# Patient Record
Sex: FEMALE | Race: OTHER | HISPANIC OR LATINO | Employment: OTHER | URBAN - METROPOLITAN AREA
[De-identification: names, ages, dates, MRNs, and addresses within clinical notes are randomized per-mention and may not be internally consistent; named-entity substitution may affect disease eponyms.]

---

## 2017-08-05 ENCOUNTER — HOSPITAL ENCOUNTER (EMERGENCY)
Facility: HOSPITAL | Age: 47
Discharge: HOME | End: 2017-08-06
Attending: EMERGENCY MEDICINE
Payer: MEDICARE

## 2017-08-05 DIAGNOSIS — J45.901 ACUTE SEVERE EXACERBATION OF ASTHMA: Primary | ICD-10-CM

## 2017-08-05 RX ORDER — ALBUTEROL SULFATE 2.5 MG/3ML
5 SOLUTION RESPIRATORY (INHALATION) ONCE
Status: COMPLETED | OUTPATIENT
Start: 2017-08-06 | End: 2017-08-06

## 2017-08-06 ENCOUNTER — APPOINTMENT (EMERGENCY)
Dept: RADIOLOGY | Facility: HOSPITAL | Age: 47
End: 2017-08-06
Payer: MEDICARE

## 2017-08-06 VITALS
DIASTOLIC BLOOD PRESSURE: 48 MMHG | HEART RATE: 127 BPM | OXYGEN SATURATION: 98 % | SYSTOLIC BLOOD PRESSURE: 84 MMHG | TEMPERATURE: 98.6 F | RESPIRATION RATE: 22 BRPM

## 2017-08-06 PROCEDURE — 94640 AIRWAY INHALATION TREATMENT: CPT

## 2017-08-06 PROCEDURE — 99283 EMERGENCY DEPT VISIT LOW MDM: CPT

## 2017-08-06 PROCEDURE — 71020 HB CHEST X-RAY 2VW FRONTAL&LATL: CPT

## 2017-08-06 RX ORDER — ALBUTEROL SULFATE 90 UG/1
2 AEROSOL, METERED RESPIRATORY (INHALATION) EVERY 4 HOURS PRN
Qty: 1 INHALER | Refills: 0 | Status: SHIPPED | OUTPATIENT
Start: 2017-08-06

## 2017-08-06 RX ORDER — AZITHROMYCIN 250 MG/1
TABLET, FILM COATED ORAL
Qty: 6 TABLET | Refills: 0 | Status: SHIPPED | OUTPATIENT
Start: 2017-08-06

## 2017-08-06 RX ORDER — PREDNISONE 20 MG/1
40 TABLET ORAL DAILY
Qty: 10 TABLET | Refills: 0 | Status: SHIPPED | OUTPATIENT
Start: 2017-08-06

## 2017-08-06 RX ORDER — ALBUTEROL SULFATE 2.5 MG/3ML
5 SOLUTION RESPIRATORY (INHALATION) ONCE
Status: COMPLETED | OUTPATIENT
Start: 2017-08-06 | End: 2017-08-06

## 2017-08-06 RX ADMIN — ALBUTEROL SULFATE 5 MG: 2.5 SOLUTION RESPIRATORY (INHALATION) at 00:03

## 2017-08-06 RX ADMIN — PREDNISONE 50 MG: 10 TABLET ORAL at 01:19

## 2017-08-06 RX ADMIN — IPRATROPIUM BROMIDE 0.5 MG: 0.5 SOLUTION RESPIRATORY (INHALATION) at 01:18

## 2017-08-06 RX ADMIN — ALBUTEROL SULFATE 5 MG: 2.5 SOLUTION RESPIRATORY (INHALATION) at 01:18

## 2017-08-06 RX ADMIN — IPRATROPIUM BROMIDE 0.5 MG: 0.5 SOLUTION RESPIRATORY (INHALATION) at 00:03

## 2017-08-06 RX ADMIN — Medication 0.5 MG: at 00:03

## 2017-08-23 ENCOUNTER — DOCTOR'S OFFICE (OUTPATIENT)
Dept: URBAN - METROPOLITAN AREA CLINIC 136 | Facility: CLINIC | Age: 47
Setting detail: OPHTHALMOLOGY
End: 2017-08-23
Payer: COMMERCIAL

## 2017-08-23 DIAGNOSIS — H40.033: ICD-10-CM

## 2017-08-23 DIAGNOSIS — H25.13: ICD-10-CM

## 2017-08-23 DIAGNOSIS — E10.3553: ICD-10-CM

## 2017-08-23 PROCEDURE — 92004 COMPRE OPH EXAM NEW PT 1/>: CPT | Performed by: OPHTHALMOLOGY

## 2017-08-23 PROCEDURE — 92134 CPTRZ OPH DX IMG PST SGM RTA: CPT | Performed by: OPHTHALMOLOGY

## 2017-08-23 ASSESSMENT — REFRACTION_AUTOREFRACTION
OD_SPHERE: -1.00
OS_AXIS: 084
OS_SPHERE: -3.00
OD_AXIS: 105
OD_CYLINDER: -1.25
OS_CYLINDER: -1.25

## 2017-08-23 ASSESSMENT — REFRACTION_MANIFEST
OS_VA3: 20/
OS_VA2: 20/
OD_VA2: 20/
OD_VA1: 20/
OU_VA: 20/
OD_VA1: 20/
OU_VA: 20/
OS_VA1: 20/
OS_VA3: 20/
OD_VA3: 20/
OS_VA3: 20/
OD_VA1: 20/
OD_VA2: 20/
OS_VA1: 20/
OS_VA2: 20/
OS_VA1: 20/
OS_VA2: 20/
OD_VA2: 20/
OD_VA3: 20/
OU_VA: 20/
OD_VA3: 20/

## 2017-08-23 ASSESSMENT — KERATOMETRY
OS_K1POWER_DIOPTERS: 44.25
OD_K2POWER_DIOPTERS: 43.50
OS_K2POWER_DIOPTERS: 44.00
OD_AXISANGLE_DEGREES: 017
OS_AXISANGLE_DEGREES: 166
OD_K1POWER_DIOPTERS: 44.00

## 2017-08-23 ASSESSMENT — CONFRONTATIONAL VISUAL FIELD TEST (CVF)
OS_FINDINGS: FULL
OD_FINDINGS: FULL

## 2017-08-23 ASSESSMENT — SPHEQUIV_DERIVED
OD_SPHEQUIV: -1.625
OS_SPHEQUIV: -3.625

## 2017-08-23 ASSESSMENT — REFRACTION_CURRENTRX
OS_OVR_VA: 20/
OD_OVR_VA: 20/
OD_OVR_VA: 20/
OS_OVR_VA: 20/
OS_OVR_VA: 20/
OD_OVR_VA: 20/

## 2017-08-23 ASSESSMENT — VISUAL ACUITY
OD_BCVA: 20/40-1
OS_BCVA: 20/70-1

## 2017-08-23 ASSESSMENT — AXIALLENGTH_DERIVED
OS_AL: 24.8371
OD_AL: 24.1459

## 2017-10-18 ENCOUNTER — DOCTOR'S OFFICE (OUTPATIENT)
Dept: URBAN - METROPOLITAN AREA CLINIC 136 | Facility: CLINIC | Age: 47
Setting detail: OPHTHALMOLOGY
End: 2017-10-18
Payer: COMMERCIAL

## 2017-10-18 DIAGNOSIS — E10.3553: ICD-10-CM

## 2017-10-18 DIAGNOSIS — H25.13: ICD-10-CM

## 2017-10-18 DIAGNOSIS — H40.033: ICD-10-CM

## 2017-10-18 PROCEDURE — 92014 COMPRE OPH EXAM EST PT 1/>: CPT | Performed by: OPHTHALMOLOGY

## 2017-10-18 ASSESSMENT — REFRACTION_MANIFEST
OD_VA1: 20/
OD_VA2: 20/
OS_VA3: 20/
OS_VA3: 20/
OS_VA2: 20/
OD_VA3: 20/
OS_VA2: 20/
OS_VA1: 20/
OD_VA2: 20/
OD_VA2: 20/
OS_VA1: 20/
OD_VA1: 20/
OU_VA: 20/
OS_VA3: 20/
OD_VA3: 20/
OD_VA1: 20/
OS_VA2: 20/
OU_VA: 20/
OD_VA3: 20/
OS_VA1: 20/
OU_VA: 20/

## 2017-10-18 ASSESSMENT — REFRACTION_CURRENTRX
OS_OVR_VA: 20/
OD_OVR_VA: 20/
OD_OVR_VA: 20/
OS_OVR_VA: 20/
OD_OVR_VA: 20/
OS_OVR_VA: 20/

## 2017-10-18 ASSESSMENT — KERATOMETRY
OS_K2POWER_DIOPTERS: 43.75
OS_K1POWER_DIOPTERS: 44.25
OD_K1POWER_DIOPTERS: 43.50
OD_AXISANGLE_DEGREES: 017
OS_AXISANGLE_DEGREES: 012
OD_K2POWER_DIOPTERS: 43.00

## 2017-10-18 ASSESSMENT — AXIALLENGTH_DERIVED
OD_AL: 24.3398
OS_AL: 24.8881

## 2017-10-18 ASSESSMENT — SPHEQUIV_DERIVED
OS_SPHEQUIV: -3.625
OD_SPHEQUIV: -1.625

## 2017-10-18 ASSESSMENT — VISUAL ACUITY
OS_BCVA: 20/125
OD_BCVA: 20/50+2

## 2017-10-18 ASSESSMENT — REFRACTION_AUTOREFRACTION
OS_AXIS: 084
OD_AXIS: 105
OD_CYLINDER: -1.25
OD_SPHERE: -1.00
OS_CYLINDER: -1.25
OS_SPHERE: -3.00

## 2017-10-18 ASSESSMENT — CONFRONTATIONAL VISUAL FIELD TEST (CVF)
OD_FINDINGS: FULL
OS_FINDINGS: FULL

## 2017-11-15 ENCOUNTER — DOCTOR'S OFFICE (OUTPATIENT)
Dept: URBAN - METROPOLITAN AREA CLINIC 136 | Facility: CLINIC | Age: 47
Setting detail: OPHTHALMOLOGY
End: 2017-11-15
Payer: COMMERCIAL

## 2017-11-15 DIAGNOSIS — E10.3553: ICD-10-CM

## 2017-11-15 DIAGNOSIS — H25.13: ICD-10-CM

## 2017-11-15 DIAGNOSIS — H35.379: ICD-10-CM

## 2017-11-15 DIAGNOSIS — H40.033: ICD-10-CM

## 2017-11-15 PROCEDURE — 92014 COMPRE OPH EXAM EST PT 1/>: CPT | Performed by: OPHTHALMOLOGY

## 2017-11-15 PROCEDURE — 92134 CPTRZ OPH DX IMG PST SGM RTA: CPT | Performed by: OPHTHALMOLOGY

## 2017-11-15 ASSESSMENT — REFRACTION_MANIFEST
OD_VA1: 20/
OU_VA: 20/
OS_VA3: 20/
OU_VA: 20/
OS_VA1: 20/
OD_VA2: 20/
OS_VA2: 20/
OS_VA1: 20/
OS_VA2: 20/
OD_VA1: 20/
OD_VA3: 20/
OS_VA3: 20/
OD_VA3: 20/
OD_VA3: 20/
OS_VA3: 20/
OS_VA2: 20/
OU_VA: 20/
OD_VA2: 20/
OS_VA1: 20/
OD_VA2: 20/
OD_VA1: 20/

## 2017-11-15 ASSESSMENT — CONFRONTATIONAL VISUAL FIELD TEST (CVF)
OD_FINDINGS: FULL
OS_FINDINGS: FULL

## 2017-11-15 ASSESSMENT — REFRACTION_CURRENTRX
OS_OVR_VA: 20/
OS_OVR_VA: 20/
OD_OVR_VA: 20/
OS_OVR_VA: 20/

## 2017-11-15 ASSESSMENT — VISUAL ACUITY
OS_BCVA: 20/70
OD_BCVA: 20/50

## 2017-11-15 ASSESSMENT — SPHEQUIV_DERIVED
OD_SPHEQUIV: -1.625
OS_SPHEQUIV: -3.625

## 2017-11-15 ASSESSMENT — REFRACTION_AUTOREFRACTION
OD_CYLINDER: -1.25
OS_AXIS: 084
OD_AXIS: 105
OS_SPHERE: -3.00
OD_SPHERE: -1.00
OS_CYLINDER: -1.25

## 2018-05-16 ENCOUNTER — RX ONLY (RX ONLY)
Age: 48
End: 2018-05-16

## 2018-05-16 ENCOUNTER — DOCTOR'S OFFICE (OUTPATIENT)
Dept: URBAN - METROPOLITAN AREA CLINIC 136 | Facility: CLINIC | Age: 48
Setting detail: OPHTHALMOLOGY
End: 2018-05-16
Payer: COMMERCIAL

## 2018-05-16 DIAGNOSIS — H35.379: ICD-10-CM

## 2018-05-16 DIAGNOSIS — E10.3553: ICD-10-CM

## 2018-05-16 DIAGNOSIS — H25.13: ICD-10-CM

## 2018-05-16 DIAGNOSIS — H40.033: ICD-10-CM

## 2018-05-16 PROCEDURE — 92134 CPTRZ OPH DX IMG PST SGM RTA: CPT | Performed by: OPHTHALMOLOGY

## 2018-05-16 PROCEDURE — 92014 COMPRE OPH EXAM EST PT 1/>: CPT | Performed by: OPHTHALMOLOGY

## 2018-05-16 ASSESSMENT — REFRACTION_AUTOREFRACTION
OD_AXIS: 105
OD_CYLINDER: -1.25
OS_CYLINDER: -1.25
OD_SPHERE: -1.00
OS_AXIS: 084
OS_SPHERE: -3.00

## 2018-05-16 ASSESSMENT — REFRACTION_MANIFEST
OS_VA2: 20/
OD_VA1: 20/
OU_VA: 20/
OD_VA2: 20/
OD_VA1: 20/
OS_VA3: 20/
OS_VA1: 20/
OS_VA3: 20/
OS_VA1: 20/
OD_VA2: 20/
OU_VA: 20/
OD_VA3: 20/
OD_VA2: 20/
OD_VA3: 20/
OU_VA: 20/
OS_VA1: 20/
OD_VA3: 20/
OS_VA2: 20/
OS_VA2: 20/
OD_VA1: 20/
OS_VA3: 20/

## 2018-05-16 ASSESSMENT — REFRACTION_CURRENTRX
OD_OVR_VA: 20/
OD_OVR_VA: 20/
OS_OVR_VA: 20/
OS_OVR_VA: 20/
OD_OVR_VA: 20/
OS_OVR_VA: 20/

## 2018-05-16 ASSESSMENT — CONFRONTATIONAL VISUAL FIELD TEST (CVF)
OS_FINDINGS: FULL
OD_FINDINGS: FULL

## 2018-05-16 ASSESSMENT — SPHEQUIV_DERIVED
OS_SPHEQUIV: -3.625
OD_SPHEQUIV: -1.625

## 2018-05-16 ASSESSMENT — VISUAL ACUITY
OD_BCVA: 20/50-2
OS_BCVA: 20/80+1

## 2018-05-30 ENCOUNTER — DOCTOR'S OFFICE (OUTPATIENT)
Dept: URBAN - METROPOLITAN AREA CLINIC 136 | Facility: CLINIC | Age: 48
Setting detail: OPHTHALMOLOGY
End: 2018-05-30
Payer: COMMERCIAL

## 2018-05-30 DIAGNOSIS — H43.812: ICD-10-CM

## 2018-05-30 DIAGNOSIS — H40.033: ICD-10-CM

## 2018-05-30 DIAGNOSIS — E10.3553: ICD-10-CM

## 2018-05-30 DIAGNOSIS — H25.011: ICD-10-CM

## 2018-05-30 DIAGNOSIS — H25.012: ICD-10-CM

## 2018-05-30 PROCEDURE — 92014 COMPRE OPH EXAM EST PT 1/>: CPT | Performed by: OPHTHALMOLOGY

## 2018-05-30 ASSESSMENT — REFRACTION_MANIFEST
OD_VA3: 20/
OD_VA2: 20/
OS_VA1: 20/
OD_VA3: 20/
OS_VA3: 20/
OD_VA1: 20/
OU_VA: 20/
OS_VA2: 20/
OD_VA2: 20/
OS_VA2: 20/
OS_VA2: 20/
OD_VA3: 20/
OU_VA: 20/
OD_VA1: 20/
OS_VA1: 20/
OU_VA: 20/
OD_VA1: 20/
OD_VA2: 20/
OS_VA3: 20/
OS_VA1: 20/
OS_VA3: 20/

## 2018-05-30 ASSESSMENT — CONFRONTATIONAL VISUAL FIELD TEST (CVF)
OS_FINDINGS: FULL
OD_FINDINGS: FULL

## 2018-05-30 ASSESSMENT — REFRACTION_CURRENTRX
OS_OVR_VA: 20/
OD_OVR_VA: 20/
OS_OVR_VA: 20/
OD_OVR_VA: 20/
OS_OVR_VA: 20/
OD_OVR_VA: 20/

## 2018-05-30 ASSESSMENT — REFRACTION_AUTOREFRACTION
OS_AXIS: 084
OD_AXIS: 105
OD_SPHERE: -1.00
OS_CYLINDER: -1.25
OS_SPHERE: -3.00
OD_CYLINDER: -1.25

## 2018-05-30 ASSESSMENT — SPHEQUIV_DERIVED
OS_SPHEQUIV: -3.625
OD_SPHEQUIV: -1.625

## 2018-05-30 ASSESSMENT — VISUAL ACUITY
OD_BCVA: 20/50-2
OS_BCVA: 20/80-

## 2018-06-11 ENCOUNTER — HOSPITAL ENCOUNTER (EMERGENCY)
Facility: HOSPITAL | Age: 48
Discharge: HOME | End: 2018-06-11
Attending: EMERGENCY MEDICINE
Payer: MEDICARE

## 2018-06-11 VITALS
RESPIRATION RATE: 22 BRPM | SYSTOLIC BLOOD PRESSURE: 122 MMHG | TEMPERATURE: 97.9 F | HEART RATE: 107 BPM | DIASTOLIC BLOOD PRESSURE: 61 MMHG | OXYGEN SATURATION: 100 % | WEIGHT: 198 LBS

## 2018-06-11 DIAGNOSIS — J02.9 ACUTE VIRAL PHARYNGITIS: Primary | ICD-10-CM

## 2018-06-11 DIAGNOSIS — J06.9 VIRAL URI WITH COUGH: ICD-10-CM

## 2018-06-11 DIAGNOSIS — H66.93 BILATERAL OTITIS MEDIA: ICD-10-CM

## 2018-06-11 PROCEDURE — 99282 EMERGENCY DEPT VISIT SF MDM: CPT

## 2018-06-11 RX ORDER — IBUPROFEN 600 MG/1
600 TABLET ORAL EVERY 8 HOURS PRN
Qty: 30 TABLET | Refills: 0 | Status: SHIPPED | OUTPATIENT
Start: 2018-06-11

## 2018-06-11 RX ORDER — AZITHROMYCIN 250 MG/1
TABLET, FILM COATED ORAL
Qty: 6 TABLET | Refills: 0 | Status: SHIPPED | OUTPATIENT
Start: 2018-06-11 | End: 2018-06-16

## 2018-06-12 NOTE — DISCHARGE INSTRUCTIONS
Otitis Media   LO QUE NECESITA SABER:   La otitis media es momo infección en el oído  INSTRUCCIONES SOBRE [facility]:   Medicamentos:  · El ibuprofeno o el acetaminofeno  ayuda a disminuir el dolor y la [place]  Están disponibles sin receta médica  Consulte con matson médico cuál medicamento es el adecuado para usted  Pregunte la cantidad y la frecuencia con que debe tomarlos  Estos medicamentos pueden provocar sangrado estomacal si no se xu correctamente  El ibuprofeno puede provocar daño al Cindy Range  No tome ibuprofeno si usted tiene enfermedad de los riñones, Nikolai John o si es alérgico a la aspirina  El acetaminofeno puede dañar el hígado  No ingiera alcohol si alan acetaminofeno  · Gotas para los oídos  ayudan a tratar el dolor de oído  · Antibióticos  ayudan a tratar la infección bacterial que provocó la infección de oído  · [place] brandan medicamentos grecia se le haya indicado  Consulte con matson médico si usted nate que matson medicamento no le está ayudando o si presenta efectos secundarios  Infórmele si es alérgico a cualquier medicamento  Mantenga momo lista actualizada de los [employer], las vitaminas y los productos herbales que alan  Incluya los siguientes datos de los medicamentos: cantidad, frecuencia y motivo de administración  Traiga con usted la lista o los envases de la píldoras a brandan citas de seguimiento  Lleve la lista de los medicamentos con usted en cheo de momo emergencia  Calor o hielo:   · El calor  puede usarse para disminuir matson dolor  Coloque un pañuelo húmedo y tibio en el oído  Aplíquelo por 15 a 20 minutos, de 3 a 4 veces al día  · El hielo  ayuda a disminuir la inflamación y el dolor  Use moom bolsa con hielo o ponga hielo triturado en momo bolsa de plástico  Jearld Keen la bolsa con momo toalla y colóquela en el oído por 15 a 20 minutos, de 3 a 4 veces por 2 días  Prevenga la otitis media:   · Lávese las phil frecuentemente  Utilice agua y Toni   [address] usar el baño, cambiarle el pañal a un niki o estornudar  Lávese las phil antes de comer o preparar alimentos  · Aléjese de las personas enfermas  Algunos microbios se propagan fácil y rápidamente con el contacto  Regreso a la escuela o al Edilberto Ankit:  Grisel Precise podría regresar al Edilberto Ankit o a la escuela cuando desaparezca la fiebre  Acuda a brandan consultas de control con saxena médico según le indicaron  Anote brandan preguntas para que se acuerde de hacerlas david brandan visitas  Pregúntele a saxena Shabana Punch vitaminas y minerales son adecuados para usted  · El dolor del oído empeora o no desaparece, incluso después del [place]  · La pare exterior del oído está maryana o inflamada  · Tiene vómitos o diarrea  · Tiene líquido saliendo del oído  · Usted tiene preguntas o inquietudes acerca de saxena condición o cuidado  Busque atención médica de inmediato o llame al 911 si:   · Usted sufre momo convulsión  · Tiene fiebre y rigidez en el edi  © 2017 [address] Information is for End User's use only and may not be sold, redistributed or otherwise used for commercial purposes  All illustrations and images included in CareNotes® are the copyrighted property of A D A M , Inc  or Macario Fraga  Esta información es sólo para uso en educación  Saxena intención no es darle un consejo médico sobre enfermedades o tratamientos  Colsulte con saxena Benuel Cornell farmacéutico antes de seguir cualquier régimen médico para saber si es seguro y efectivo para usted  Infección respiratoria superior   LO QUE NECESITA SABER:   Momo infección respiratoria superior también se conoce grecia el resfriado común  Suni es momo infección que puede afectar saxena nariz, garganta, oídos y los senos frontales  Para personas saludables, el resfriado común generalmente no es grave y no requiere tratamiento especial  Los síntomas del resfriado generalmente son [place] graves david los primeros 3 a 5 días   [address] mejoran en 7 a 14 días  Puede que usted siga tosiendo por 2 a 3 semanas  Los resfriados son provocados por virus y no mejoran con antibióticos  INSTRUCCIONES SOBRE [facility]:   Busque atención médica de inmediato si:   · Usted tiene dolor torácico y dificultad para respirar  Pregúntele a matson Sherol Mince vitaminas y minerales son adecuados para usted  · Usted tiene fiebre de más de 102ºF [facility])  · Matson garganta irritada empeora o usted ve manchas brittny o alee en matson garganta  · Luisana síntomas empeoran después de 3 a 5 días o matson resfriado no mejora en 14 días  · Usted tiene sarpullido en alguna parte de matson piel  · Usted tiene bultos grandes y sensible en matson edi    · Usted tiene secreción espesa de color reji o amarillo proveniente de matson nariz  · Usted expectora mucosidad de color amarillo, reji o con [place]  · Usted vomita por más de 24 horas y no puede retener líquidos en el estómago  · Usted tiene un grave dolor de oído  · Usted tiene preguntas o inquietudes acerca de matson condición o cuidado  Medicamentos:  Es posible que usted necesite alguno de los siguientes:  · Los descongestionantes  ayudan a reducir la congestión nasal y Alveria Fogo a respirar más fácilmente  Si alan pastillas descongestionantes, pueden causarle agitación o problemas para dormir  No use aerosol descongestionante por más de unos cuantos días  · Los jarabes para la tos  ayudan a reducir la tos  Pregúntele a matson médico cuál tipo de medicamento para la tos es mejor para usted  · AINEs (Analgésicos antiinflamatorios no esteroides) grecia el ibuprofeno, ayudan a disminuir la inflamación, el dolor y la [place]  Los AINEs pueden causar sangrado estomacal o problemas renales en ciertas personas  Si usted alan un medicamento anticoagulante, siempre pregúntele a matson médico si los SAKINA son seguros para usted  Siempre tru la etiqueta de mirela medicamento y [place] instrucciones      · Acetaminofeno:  Joanna Calvin dolor y baja la fiebre  Está disponible sin receta médica  Pregunte la cantidad y la frecuencia con que debe tomarlos  [address]  [place] las etiquetas de todos los demás medicamentos que esté usando para saber si también contienen acetaminofén, o pregunte a matson médico o farmacéutico  El acetaminofén puede causar daño en el hígado cuando no se alan de forma correcta  No use más de 4 gramos (4000 miligramos) en total de acetaminofeno en un día  · [place] brandan medicamentos grecia se le haya indicado  Consulte con matson médico si usted nate que matson medicamento no le está ayudando o si presenta efectos secundarios  Infórmele si es alérgico a cualquier medicamento  Mantenga momo lista actualizada de los [place], las vitaminas y los productos herbales que alan  Incluya los siguientes datos de los medicamentos: cantidad, frecuencia y motivo de administración  Traiga con usted la lista o los envases de la píldoras a brandan citas de seguimiento  Lleve la lista de los medicamentos con usted en cheo de moom emergencia  Acuda a brandan consultas de control con matson médico según le indicaron  Anote brandan preguntas para que se acuerde de hacerlas david brandan visitas  Cuidados personales:   · Descanse el mayor tiempo posible  Empiece a hacer un poco más día a día  · [employer] líquidos grecia se le indique  Los líquidos le ayudarán a aflojar y disolver la mucosidad para que la pueda expectorar  Los líquidos ayudarán a evitar la deshidratación  Los líquidos que ayudan a prevenir la deshidratación pueden ser [place] y caldo  No tome líquidos que contienen cafeína  La cafeína puede aumentar el riesgo de deshidratación  Pregúntele a matson médico cuál es la cantidad de líquido que necesita ingerir a diario  · Alivie el dolor de garganta  Rajani gárgaras de agua tibia con sal  [place] ayuda a aliviar el dolor de garganta  Prepare agua salina disolviendo ¼ de cucharada de sal a 1 taza de agua tibia   Usted puede también della dulces duros o pastillas para la garganta  Usted puede usar aerosol para el dolor de garganta  · Use un humidificador o vaporizador  Use un humidificador de marie frío o un vaporizador para elevar la humedad en saxena casa  [place] podría ayudarle a respirar más fácilmente y al mismo tiempo disminuir la tos  · Use gotas nasales de solución salina grecia se le haya indicado  Estas ayudan a Ouray Hammond  · Aplique vaselina en la parte externa alrededor de las fosas nasales  Esta puede disminuir la irritación de sonarse la nariz  · No fume  La nicotina y otros químicos en los cigarrillos y cigarros pueden empeorar brandan síntomas  También pueden causar infecciones grecia la bronquitis o la neumonía  Pida información a saxena médico si usted actualmente fuma y necesita ayuda para dejar de fumar  Los cigarrillos electrónicos o tabaco sin humo todavía contienen nicotina  Consulte con saxena médico antes de [employer]  Evite transmitir saxena resfriado a los demás:   · Trate de mantenerse alejado de otras personas david los primeros 2 a 3 días de saxena resfriado cuando éste es más fácil de transmitir  · No comparta alimentos o bebidas  · No comparta toallas de mano con otros miembros de mayco en el [facility]  · [employer] frecuentemente, especialmente después de sonarse la nariz  Lloyd la espalda a otras personas y cúbrase la boca y la nariz con un pañuelo desechable cuando estornuda o tose  © 2017 [address] [employer] Information is for End User's use only and may not be sold, redistributed or otherwise used for commercial purposes  All illustrations and images included in CareNotes® are the copyrighted property of A D A M , Inc  or Macario Fraga  Esta información es sólo para uso en educación  Saxena intención no es darle un consejo médico sobre enfermedades o tratamientos   Colsulte con saxena Cherylyn Plough farmacéutico antes de seguir cualquier régimen médico para saber si es seguro y efectivo para usted

## 2018-07-13 ENCOUNTER — DOCTOR'S OFFICE (OUTPATIENT)
Dept: URBAN - METROPOLITAN AREA CLINIC 136 | Facility: CLINIC | Age: 48
Setting detail: OPHTHALMOLOGY
End: 2018-07-13
Payer: COMMERCIAL

## 2018-07-13 DIAGNOSIS — H25.011: ICD-10-CM

## 2018-07-13 PROCEDURE — 92136 OPHTHALMIC BIOMETRY: CPT | Performed by: OPHTHALMOLOGY

## 2018-08-06 ENCOUNTER — AMBUL SURGICAL CARE (OUTPATIENT)
Dept: URBAN - METROPOLITAN AREA SURGERY 29 | Facility: SURGERY | Age: 48
Setting detail: OPHTHALMOLOGY
End: 2018-08-06
Payer: COMMERCIAL

## 2018-08-06 DIAGNOSIS — H25.041: ICD-10-CM

## 2018-08-06 DIAGNOSIS — H25.11: ICD-10-CM

## 2018-08-06 DIAGNOSIS — H25.011: ICD-10-CM

## 2018-08-06 PROCEDURE — G8907 PT DOC NO EVENTS ON DISCHARG: HCPCS | Performed by: OPHTHALMOLOGY

## 2018-08-06 PROCEDURE — 66984 XCAPSL CTRC RMVL W/O ECP: CPT | Performed by: OPHTHALMOLOGY

## 2018-08-06 PROCEDURE — G8918 PT W/O PREOP ORDER IV AB PRO: HCPCS | Performed by: OPHTHALMOLOGY

## 2018-08-07 ENCOUNTER — RX ONLY (RX ONLY)
Age: 48
End: 2018-08-07

## 2018-08-07 ENCOUNTER — DOCTOR'S OFFICE (OUTPATIENT)
Dept: URBAN - METROPOLITAN AREA CLINIC 136 | Facility: CLINIC | Age: 48
Setting detail: OPHTHALMOLOGY
End: 2018-08-07
Payer: COMMERCIAL

## 2018-08-07 DIAGNOSIS — Z96.1: ICD-10-CM

## 2018-08-07 DIAGNOSIS — H25.012: ICD-10-CM

## 2018-08-07 PROCEDURE — 92136 OPHTHALMIC BIOMETRY: CPT | Performed by: OPHTHALMOLOGY

## 2018-08-07 PROCEDURE — 99024 POSTOP FOLLOW-UP VISIT: CPT | Performed by: OPHTHALMOLOGY

## 2018-08-07 ASSESSMENT — REFRACTION_MANIFEST
OS_VA2: 20/
OD_VA1: 20/
OS_VA1: 20/
OS_VA1: 20/
OS_VA2: 20/
OD_VA3: 20/
OD_VA2: 20/
OD_VA2: 20/
OU_VA: 20/
OU_VA: 20/
OD_VA3: 20/
OD_VA1: 20/
OD_VA2: 20/
OS_VA3: 20/
OS_VA3: 20/
OS_VA1: 20/
OD_VA3: 20/
OU_VA: 20/
OS_VA2: 20/
OD_VA1: 20/
OS_VA3: 20/

## 2018-08-07 ASSESSMENT — VISUAL ACUITY
OS_BCVA: 20/70
OD_BCVA: 20/50-2

## 2018-08-07 ASSESSMENT — AXIALLENGTH_DERIVED
OS_AL: 24.8881
OD_AL: 24.3398

## 2018-08-07 ASSESSMENT — REFRACTION_CURRENTRX
OS_OVR_VA: 20/
OS_OVR_VA: 20/
OD_OVR_VA: 20/
OS_OVR_VA: 20/
OD_OVR_VA: 20/
OD_OVR_VA: 20/

## 2018-08-07 ASSESSMENT — SPHEQUIV_DERIVED
OS_SPHEQUIV: -3.625
OD_SPHEQUIV: -1.625

## 2018-08-07 ASSESSMENT — REFRACTION_AUTOREFRACTION
OS_AXIS: 084
OD_SPHERE: -1.00
OS_SPHERE: -3.00
OD_CYLINDER: -1.25
OS_CYLINDER: -1.25
OD_AXIS: 105

## 2018-08-07 ASSESSMENT — KERATOMETRY
OD_K1POWER_DIOPTERS: 43.50
OD_AXISANGLE_DEGREES: 017
OS_AXISANGLE_DEGREES: 012
OD_K2POWER_DIOPTERS: 43.00
OS_K2POWER_DIOPTERS: 43.75
OS_K1POWER_DIOPTERS: 44.25

## 2018-08-15 ENCOUNTER — DOCTOR'S OFFICE (OUTPATIENT)
Dept: URBAN - METROPOLITAN AREA CLINIC 136 | Facility: CLINIC | Age: 48
Setting detail: OPHTHALMOLOGY
End: 2018-08-15
Payer: COMMERCIAL

## 2018-08-15 DIAGNOSIS — Z96.1: ICD-10-CM

## 2018-08-15 PROCEDURE — 99024 POSTOP FOLLOW-UP VISIT: CPT | Performed by: OPHTHALMOLOGY

## 2018-08-15 ASSESSMENT — REFRACTION_MANIFEST
OD_VA3: 20/
OS_VA3: 20/
OS_VA3: 20/
OS_VA2: 20/
OS_VA1: 20/
OS_VA1: 20/
OS_VA2: 20/
OD_VA1: 20/
OD_VA2: 20/
OD_VA2: 20/
OD_VA3: 20/
OS_VA2: 20/
OU_VA: 20/
OS_VA1: 20/
OD_VA2: 20/
OU_VA: 20/
OD_VA1: 20/
OU_VA: 20/
OD_VA3: 20/
OS_VA3: 20/
OD_VA1: 20/

## 2018-08-15 ASSESSMENT — REFRACTION_CURRENTRX
OS_OVR_VA: 20/
OD_OVR_VA: 20/
OS_OVR_VA: 20/
OD_OVR_VA: 20/
OS_OVR_VA: 20/
OD_OVR_VA: 20/

## 2018-08-15 ASSESSMENT — SPHEQUIV_DERIVED
OS_SPHEQUIV: -3.625
OD_SPHEQUIV: -1.625

## 2018-08-15 ASSESSMENT — REFRACTION_AUTOREFRACTION
OS_SPHERE: -3.00
OS_CYLINDER: -1.25
OD_CYLINDER: -1.25
OS_AXIS: 084
OD_AXIS: 105
OD_SPHERE: -1.00

## 2018-08-15 ASSESSMENT — VISUAL ACUITY
OD_BCVA: 20/50-2
OS_BCVA: 20/60

## 2018-08-20 ENCOUNTER — AMBUL SURGICAL CARE (OUTPATIENT)
Dept: URBAN - METROPOLITAN AREA SURGERY 29 | Facility: SURGERY | Age: 48
Setting detail: OPHTHALMOLOGY
End: 2018-08-20
Payer: COMMERCIAL

## 2018-08-20 DIAGNOSIS — H25.042: ICD-10-CM

## 2018-08-20 DIAGNOSIS — H25.12: ICD-10-CM

## 2018-08-20 DIAGNOSIS — H25.012: ICD-10-CM

## 2018-08-20 PROCEDURE — G8918 PT W/O PREOP ORDER IV AB PRO: HCPCS | Performed by: OPHTHALMOLOGY

## 2018-08-20 PROCEDURE — G8907 PT DOC NO EVENTS ON DISCHARG: HCPCS | Performed by: OPHTHALMOLOGY

## 2018-08-20 PROCEDURE — 66984 XCAPSL CTRC RMVL W/O ECP: CPT | Performed by: OPHTHALMOLOGY

## 2018-08-21 ENCOUNTER — DOCTOR'S OFFICE (OUTPATIENT)
Dept: URBAN - METROPOLITAN AREA CLINIC 136 | Facility: CLINIC | Age: 48
Setting detail: OPHTHALMOLOGY
End: 2018-08-21
Payer: COMMERCIAL

## 2018-08-21 ENCOUNTER — RX ONLY (RX ONLY)
Age: 48
End: 2018-08-21

## 2018-08-21 DIAGNOSIS — Z96.1: ICD-10-CM

## 2018-08-21 PROBLEM — H25.012 CATARACT; LEFT EYE: Status: RESOLVED | Noted: 2018-05-30 | Resolved: 2018-08-21

## 2018-08-21 PROCEDURE — 99024 POSTOP FOLLOW-UP VISIT: CPT | Performed by: OPHTHALMOLOGY

## 2018-08-21 ASSESSMENT — SPHEQUIV_DERIVED
OS_SPHEQUIV: -3.625
OD_SPHEQUIV: -1.625

## 2018-08-21 ASSESSMENT — REFRACTION_MANIFEST
OD_VA2: 20/
OD_VA2: 20/
OU_VA: 20/
OS_VA1: 20/
OD_VA1: 20/
OS_VA2: 20/
OU_VA: 20/
OD_VA3: 20/
OU_VA: 20/
OD_VA3: 20/
OS_VA1: 20/
OD_VA2: 20/
OD_VA1: 20/
OS_VA3: 20/
OD_VA3: 20/
OS_VA3: 20/
OD_VA1: 20/
OS_VA1: 20/
OS_VA2: 20/
OS_VA3: 20/
OS_VA2: 20/

## 2018-08-21 ASSESSMENT — REFRACTION_AUTOREFRACTION
OD_CYLINDER: -1.25
OS_CYLINDER: -1.25
OD_AXIS: 105
OS_AXIS: 084
OS_SPHERE: -3.00
OD_SPHERE: -1.00

## 2018-08-21 ASSESSMENT — REFRACTION_CURRENTRX
OS_OVR_VA: 20/
OD_OVR_VA: 20/
OS_OVR_VA: 20/
OS_OVR_VA: 20/
OD_OVR_VA: 20/
OD_OVR_VA: 20/

## 2018-08-21 ASSESSMENT — VISUAL ACUITY
OS_BCVA: 20/30
OD_BCVA: 20/40

## 2018-08-21 ASSESSMENT — SUPERFICIAL PUNCTATE KERATITIS (SPK)
OS_SPK: 1+
OD_SPK: 1+

## 2018-09-04 ENCOUNTER — RX ONLY (RX ONLY)
Age: 48
End: 2018-09-04

## 2018-09-04 ENCOUNTER — DOCTOR'S OFFICE (OUTPATIENT)
Dept: URBAN - METROPOLITAN AREA CLINIC 136 | Facility: CLINIC | Age: 48
Setting detail: OPHTHALMOLOGY
End: 2018-09-04
Payer: COMMERCIAL

## 2018-09-04 DIAGNOSIS — Z96.1: ICD-10-CM

## 2018-09-04 PROCEDURE — 99024 POSTOP FOLLOW-UP VISIT: CPT | Performed by: OPHTHALMOLOGY

## 2018-09-04 ASSESSMENT — REFRACTION_CURRENTRX
OS_OVR_VA: 20/
OD_OVR_VA: 20/
OD_OVR_VA: 20/
OS_OVR_VA: 20/
OS_OVR_VA: 20/
OD_OVR_VA: 20/

## 2018-09-04 ASSESSMENT — REFRACTION_MANIFEST
OU_VA: 20/
OD_VA3: 20/
OS_VA3: 20/
OD_VA3: 20/
OD_VA1: 20/
OU_VA: 20/
OS_VA2: 20/
OS_VA3: 20/
OD_VA2: 20/
OS_VA1: 20/
OS_VA2: 20/
OD_VA1: 20/
OD_VA2: 20/
OS_VA1: 20/

## 2018-09-04 ASSESSMENT — REFRACTION_AUTOREFRACTION
OD_AXIS: 095
OD_CYLINDER: -1.50
OD_SPHERE: -0.50
OS_SPHERE: -0.50
OS_AXIS: 081
OS_CYLINDER: -1.75

## 2018-09-04 ASSESSMENT — KERATOMETRY
OS_K2POWER_DIOPTERS: 43.75
OD_K2POWER_DIOPTERS: 43.00
OD_K1POWER_DIOPTERS: 43.50
OD_AXISANGLE_DEGREES: 017
OS_AXISANGLE_DEGREES: 012
OS_K1POWER_DIOPTERS: 44.25

## 2018-09-04 ASSESSMENT — SPHEQUIV_DERIVED
OS_SPHEQUIV: -1.375
OD_SPHEQUIV: -1.25

## 2018-09-04 ASSESSMENT — VISUAL ACUITY
OS_BCVA: 20/50
OD_BCVA: 20/40

## 2018-09-04 ASSESSMENT — SUPERFICIAL PUNCTATE KERATITIS (SPK)
OD_SPK: 1+
OS_SPK: 1+

## 2018-09-04 ASSESSMENT — AXIALLENGTH_DERIVED
OS_AL: 23.9493
OD_AL: 24.1853

## 2018-09-12 ENCOUNTER — DOCTOR'S OFFICE (OUTPATIENT)
Dept: URBAN - METROPOLITAN AREA CLINIC 136 | Facility: CLINIC | Age: 48
Setting detail: OPHTHALMOLOGY
End: 2018-09-12
Payer: COMMERCIAL

## 2018-09-12 DIAGNOSIS — Z96.1: ICD-10-CM

## 2018-09-12 DIAGNOSIS — E10.3553: ICD-10-CM

## 2018-09-12 DIAGNOSIS — H43.812: ICD-10-CM

## 2018-09-12 DIAGNOSIS — H35.379: ICD-10-CM

## 2018-09-12 PROCEDURE — 92134 CPTRZ OPH DX IMG PST SGM RTA: CPT | Performed by: OPHTHALMOLOGY

## 2018-09-12 PROCEDURE — 92014 COMPRE OPH EXAM EST PT 1/>: CPT | Performed by: OPHTHALMOLOGY

## 2018-09-12 ASSESSMENT — SUPERFICIAL PUNCTATE KERATITIS (SPK)
OD_SPK: 1+
OS_SPK: 1+

## 2018-09-12 ASSESSMENT — CONFRONTATIONAL VISUAL FIELD TEST (CVF)
OD_FINDINGS: FULL
OS_FINDINGS: FULL

## 2018-09-13 ASSESSMENT — VISUAL ACUITY
OS_BCVA: 20/50+1
OD_BCVA: 20/30

## 2018-09-13 ASSESSMENT — REFRACTION_CURRENTRX
OS_OVR_VA: 20/
OD_OVR_VA: 20/
OS_OVR_VA: 20/
OD_OVR_VA: 20/
OD_OVR_VA: 20/
OS_OVR_VA: 20/

## 2018-09-13 ASSESSMENT — REFRACTION_AUTOREFRACTION
OD_CYLINDER: -1.50
OS_AXIS: 081
OD_AXIS: 095
OS_SPHERE: -0.50
OS_CYLINDER: -1.75
OD_SPHERE: -0.50

## 2018-09-13 ASSESSMENT — REFRACTION_MANIFEST
OD_VA3: 20/
OD_VA1: 20/
OS_VA3: 20/
OD_VA1: 20/
OU_VA: 20/
OS_VA1: 20/
OD_VA3: 20/
OD_VA2: 20/
OU_VA: 20/
OS_VA3: 20/
OS_VA2: 20/
OD_VA2: 20/
OS_VA2: 20/
OS_VA1: 20/

## 2018-09-13 ASSESSMENT — SPHEQUIV_DERIVED
OD_SPHEQUIV: -1.25
OS_SPHEQUIV: -1.375

## 2018-09-19 ENCOUNTER — DOCTOR'S OFFICE (OUTPATIENT)
Dept: URBAN - METROPOLITAN AREA CLINIC 136 | Facility: CLINIC | Age: 48
Setting detail: OPHTHALMOLOGY
End: 2018-09-19
Payer: COMMERCIAL

## 2018-09-19 DIAGNOSIS — H52.4: ICD-10-CM

## 2018-09-19 DIAGNOSIS — H52.03: ICD-10-CM

## 2018-09-19 DIAGNOSIS — Z96.1: ICD-10-CM

## 2018-09-19 DIAGNOSIS — H52.223: ICD-10-CM

## 2018-09-19 PROCEDURE — 92015 DETERMINE REFRACTIVE STATE: CPT | Performed by: OPTOMETRIST

## 2018-09-19 PROCEDURE — 99024 POSTOP FOLLOW-UP VISIT: CPT | Performed by: OPTOMETRIST

## 2018-09-19 ASSESSMENT — REFRACTION_MANIFEST
OS_VA2: 20/20
OD_VA2: 20/20
OS_VA1: 20/
OU_VA: 20/30+2
OS_AXIS: 080
OS_SPHERE: +1.00
OD_CYLINDER: -1.00
OD_SPHERE: +1.50
OS_VA3: 20/
OD_VA2: 20/
OD_VA3: 20/
OS_VA3: 20/
OS_CYLINDER: -1.00
OD_VA1: 20/
OS_ADD: +2.50
OU_VA: 20/
OD_VA3: 20/
OD_ADD: +2.50
OD_AXIS: 100
OD_VA1: 20/30+2
OS_VA1: 20/30+2
OS_VA2: 20/

## 2018-09-19 ASSESSMENT — REFRACTION_AUTOREFRACTION
OS_CYLINDER: -1.00
OS_AXIS: 078
OD_CYLINDER: -1.25
OD_AXIS: 098
OD_SPHERE: +1.50
OS_SPHERE: +1.00

## 2018-09-19 ASSESSMENT — SPHEQUIV_DERIVED
OD_SPHEQUIV: 0.875
OD_SPHEQUIV: 1
OS_SPHEQUIV: 0.5
OS_SPHEQUIV: 0.5

## 2018-09-19 ASSESSMENT — KERATOMETRY
OD_K1POWER_DIOPTERS: 43.50
OS_K1POWER_DIOPTERS: 44.25
OD_AXISANGLE_DEGREES: 017
OS_AXISANGLE_DEGREES: 012
OS_K2POWER_DIOPTERS: 43.75
OD_K2POWER_DIOPTERS: 43.00

## 2018-09-19 ASSESSMENT — SUPERFICIAL PUNCTATE KERATITIS (SPK)
OD_SPK: ABSENT
OS_SPK: ABSENT

## 2018-09-19 ASSESSMENT — REFRACTION_CURRENTRX
OS_OVR_VA: 20/
OD_OVR_VA: 20/
OS_OVR_VA: 20/
OS_OVR_VA: 20/

## 2018-09-19 ASSESSMENT — VISUAL ACUITY
OS_BCVA: 20/50
OD_BCVA: 20/30

## 2018-09-19 ASSESSMENT — CONFRONTATIONAL VISUAL FIELD TEST (CVF)
OD_FINDINGS: FULL
OS_FINDINGS: FULL

## 2018-09-19 ASSESSMENT — AXIALLENGTH_DERIVED
OD_AL: 23.3454
OS_AL: 23.2194
OD_AL: 23.2978
OS_AL: 23.2194

## 2018-11-27 ENCOUNTER — DOCTOR'S OFFICE (OUTPATIENT)
Dept: URBAN - METROPOLITAN AREA CLINIC 136 | Facility: CLINIC | Age: 48
Setting detail: OPHTHALMOLOGY
End: 2018-11-27
Payer: COMMERCIAL

## 2018-11-27 ENCOUNTER — OPTICAL (OUTPATIENT)
Dept: URBAN - METROPOLITAN AREA CLINIC 144 | Facility: CLINIC | Age: 48
Setting detail: OPHTHALMOLOGY
End: 2018-11-27
Payer: COMMERCIAL

## 2018-11-27 DIAGNOSIS — Z96.1: ICD-10-CM

## 2018-11-27 PROCEDURE — V2103 SPHEROCYLINDR 4.00D/12-2.00D: HCPCS | Performed by: OPTOMETRIST

## 2018-11-27 PROCEDURE — 92012 INTRM OPH EXAM EST PATIENT: CPT | Performed by: OPHTHALMOLOGY

## 2018-11-27 PROCEDURE — V2020 VISION SVCS FRAMES PURCHASES: HCPCS | Performed by: OPTOMETRIST

## 2018-11-27 ASSESSMENT — REFRACTION_MANIFEST
OS_VA3: 20/
OD_VA1: 20/
OU_VA: 20/30+2
OU_VA: 20/
OS_SPHERE: +1.00
OS_VA2: 20/20
OS_ADD: +2.50
OD_CYLINDER: -1.00
OD_VA3: 20/
OS_VA3: 20/
OD_VA2: 20/20
OD_VA3: 20/
OS_VA2: 20/
OD_SPHERE: +1.50
OS_AXIS: 080
OD_ADD: +2.50
OD_AXIS: 100
OS_VA1: 20/
OS_VA1: 20/30+2
OD_VA1: 20/30+2
OD_VA2: 20/
OS_CYLINDER: -1.00

## 2018-11-27 ASSESSMENT — REFRACTION_CURRENTRX
OD_OVR_VA: 20/
OS_OVR_VA: 20/
OD_OVR_VA: 20/
OS_OVR_VA: 20/
OD_OVR_VA: 20/
OS_OVR_VA: 20/

## 2018-11-27 ASSESSMENT — SPHEQUIV_DERIVED
OS_SPHEQUIV: 0.5
OD_SPHEQUIV: 0.875
OS_SPHEQUIV: 0.5
OD_SPHEQUIV: 1

## 2018-11-27 ASSESSMENT — SUPERFICIAL PUNCTATE KERATITIS (SPK)
OS_SPK: ABSENT
OD_SPK: ABSENT

## 2018-11-27 ASSESSMENT — REFRACTION_AUTOREFRACTION
OD_CYLINDER: -1.25
OS_SPHERE: +1.00
OD_SPHERE: +1.50
OS_CYLINDER: -1.00
OS_AXIS: 078
OD_AXIS: 098

## 2018-11-27 ASSESSMENT — VISUAL ACUITY
OD_BCVA: 20/30
OS_BCVA: 20/50

## 2019-04-10 ENCOUNTER — DOCTOR'S OFFICE (OUTPATIENT)
Dept: URBAN - METROPOLITAN AREA CLINIC 136 | Facility: CLINIC | Age: 49
Setting detail: OPHTHALMOLOGY
End: 2019-04-10
Payer: COMMERCIAL

## 2019-04-10 DIAGNOSIS — H35.373: ICD-10-CM

## 2019-04-10 DIAGNOSIS — Z96.1: ICD-10-CM

## 2019-04-10 DIAGNOSIS — H43.812: ICD-10-CM

## 2019-04-10 DIAGNOSIS — E10.3553: ICD-10-CM

## 2019-04-10 PROCEDURE — 92134 CPTRZ OPH DX IMG PST SGM RTA: CPT | Performed by: OPHTHALMOLOGY

## 2019-04-10 PROCEDURE — 92014 COMPRE OPH EXAM EST PT 1/>: CPT | Performed by: OPHTHALMOLOGY

## 2019-04-10 ASSESSMENT — CONFRONTATIONAL VISUAL FIELD TEST (CVF)
OD_FINDINGS: FULL
OS_FINDINGS: FULL

## 2019-04-10 ASSESSMENT — SUPERFICIAL PUNCTATE KERATITIS (SPK)
OD_SPK: ABSENT
OS_SPK: ABSENT

## 2019-04-12 ASSESSMENT — REFRACTION_AUTOREFRACTION
OD_CYLINDER: -1.25
OS_CYLINDER: -1.00
OS_SPHERE: +1.00
OS_AXIS: 078
OD_SPHERE: +1.50
OD_AXIS: 098

## 2019-04-12 ASSESSMENT — REFRACTION_MANIFEST
OS_SPHERE: +1.00
OD_VA3: 20/
OS_ADD: +2.50
OS_CYLINDER: -1.00
OD_VA3: 20/
OS_VA3: 20/
OD_VA1: 20/
OD_VA2: 20/20
OD_CYLINDER: -1.00
OU_VA: 20/
OD_VA2: 20/
OD_AXIS: 100
OS_VA1: 20/30+2
OD_SPHERE: +1.50
OS_VA2: 20/
OU_VA: 20/30+2
OS_VA3: 20/
OD_ADD: +2.50
OS_AXIS: 080
OS_VA1: 20/
OD_VA1: 20/30+2
OS_VA2: 20/20

## 2019-04-12 ASSESSMENT — REFRACTION_CURRENTRX
OD_OVR_VA: 20/
OD_OVR_VA: 20/
OS_OVR_VA: 20/
OS_OVR_VA: 20/
OD_OVR_VA: 20/
OS_OVR_VA: 20/

## 2019-04-12 ASSESSMENT — SPHEQUIV_DERIVED
OS_SPHEQUIV: 0.5
OS_SPHEQUIV: 0.5
OD_SPHEQUIV: 0.875
OD_SPHEQUIV: 1

## 2019-04-12 ASSESSMENT — VISUAL ACUITY
OD_BCVA: 20/25+2
OS_BCVA: 20/30-1

## 2019-07-24 ENCOUNTER — DOCTOR'S OFFICE (OUTPATIENT)
Dept: URBAN - METROPOLITAN AREA CLINIC 136 | Facility: CLINIC | Age: 49
Setting detail: OPHTHALMOLOGY
End: 2019-07-24
Payer: COMMERCIAL

## 2019-07-24 DIAGNOSIS — H43.12: ICD-10-CM

## 2019-07-24 DIAGNOSIS — H35.379: ICD-10-CM

## 2019-07-24 DIAGNOSIS — E10.3553: ICD-10-CM

## 2019-07-24 DIAGNOSIS — H43.812: ICD-10-CM

## 2019-07-24 PROCEDURE — 92014 COMPRE OPH EXAM EST PT 1/>: CPT | Performed by: OPHTHALMOLOGY

## 2019-07-24 PROCEDURE — 92250 FUNDUS PHOTOGRAPHY W/I&R: CPT | Performed by: OPHTHALMOLOGY

## 2019-07-24 ASSESSMENT — REFRACTION_MANIFEST
OD_VA1: 20/
OD_AXIS: 100
OD_VA2: 20/
OS_VA1: 20/
OS_VA1: 20/30+2
OD_VA3: 20/
OD_SPHERE: +1.50
OD_VA3: 20/
OU_VA: 20/
OD_ADD: +2.50
OS_VA3: 20/
OS_CYLINDER: -1.00
OD_VA2: 20/20
OU_VA: 20/30+2
OS_ADD: +2.50
OS_VA2: 20/20
OD_VA1: 20/30+2
OD_CYLINDER: -1.00
OS_VA2: 20/
OS_VA3: 20/
OS_AXIS: 080
OS_SPHERE: +1.00

## 2019-07-24 ASSESSMENT — VISUAL ACUITY
OD_BCVA: 20/25
OS_BCVA: 20/30-2

## 2019-07-24 ASSESSMENT — REFRACTION_AUTOREFRACTION
OS_SPHERE: +1.00
OD_SPHERE: +1.50
OD_CYLINDER: -1.25
OS_CYLINDER: -1.00
OD_AXIS: 098
OS_AXIS: 078

## 2019-07-24 ASSESSMENT — REFRACTION_CURRENTRX
OD_OVR_VA: 20/
OS_OVR_VA: 20/
OD_OVR_VA: 20/
OS_OVR_VA: 20/
OS_OVR_VA: 20/
OD_OVR_VA: 20/

## 2019-07-24 ASSESSMENT — CONFRONTATIONAL VISUAL FIELD TEST (CVF)
OS_FINDINGS: FULL
OD_FINDINGS: FULL

## 2019-07-24 ASSESSMENT — SPHEQUIV_DERIVED
OD_SPHEQUIV: 1
OD_SPHEQUIV: 0.875
OS_SPHEQUIV: 0.5
OS_SPHEQUIV: 0.5

## 2019-07-24 ASSESSMENT — SUPERFICIAL PUNCTATE KERATITIS (SPK)
OD_SPK: ABSENT
OS_SPK: ABSENT

## 2019-08-20 ENCOUNTER — AMBUL SURGICAL CARE (OUTPATIENT)
Dept: URBAN - METROPOLITAN AREA SURGERY 32 | Facility: SURGERY | Age: 49
Setting detail: OPHTHALMOLOGY
End: 2019-08-20
Payer: COMMERCIAL

## 2019-08-20 DIAGNOSIS — E11.3512: ICD-10-CM

## 2019-08-20 PROCEDURE — G8907 PT DOC NO EVENTS ON DISCHARG: HCPCS | Performed by: OPHTHALMOLOGY

## 2019-08-20 PROCEDURE — G8918 PT W/O PREOP ORDER IV AB PRO: HCPCS | Performed by: OPHTHALMOLOGY

## 2019-08-20 PROCEDURE — 67228 TREATMENT X10SV RETINOPATHY: CPT | Performed by: OPHTHALMOLOGY

## 2019-10-02 ENCOUNTER — DOCTOR'S OFFICE (OUTPATIENT)
Dept: URBAN - METROPOLITAN AREA CLINIC 136 | Facility: CLINIC | Age: 49
Setting detail: OPHTHALMOLOGY
End: 2019-10-02
Payer: COMMERCIAL

## 2019-10-02 DIAGNOSIS — H35.373: ICD-10-CM

## 2019-10-02 DIAGNOSIS — H43.12: ICD-10-CM

## 2019-10-02 DIAGNOSIS — E10.3553: ICD-10-CM

## 2019-10-02 DIAGNOSIS — H43.812: ICD-10-CM

## 2019-10-02 DIAGNOSIS — Z96.1: ICD-10-CM

## 2019-10-02 PROCEDURE — 92134 CPTRZ OPH DX IMG PST SGM RTA: CPT | Performed by: OPHTHALMOLOGY

## 2019-10-02 PROCEDURE — 92014 COMPRE OPH EXAM EST PT 1/>: CPT | Performed by: OPHTHALMOLOGY

## 2019-10-02 ASSESSMENT — SUPERFICIAL PUNCTATE KERATITIS (SPK)
OD_SPK: T
OS_SPK: T

## 2019-10-02 ASSESSMENT — SPHEQUIV_DERIVED
OS_SPHEQUIV: 0.5
OS_SPHEQUIV: 0.5
OD_SPHEQUIV: 1
OD_SPHEQUIV: 0.875

## 2019-10-02 ASSESSMENT — REFRACTION_CURRENTRX
OD_OVR_VA: 20/
OS_OVR_VA: 20/
OS_OVR_VA: 20/
OD_OVR_VA: 20/
OD_OVR_VA: 20/
OS_OVR_VA: 20/

## 2019-10-02 ASSESSMENT — REFRACTION_MANIFEST
OS_CYLINDER: -1.00
OD_VA1: 20/
OS_VA1: 20/
OS_AXIS: 080
OU_VA: 20/
OD_VA2: 20/20
OS_VA2: 20/
OS_VA3: 20/
OS_ADD: +2.50
OD_VA2: 20/
OS_VA3: 20/
OS_SPHERE: +1.00
OD_AXIS: 100
OU_VA: 20/30+2
OD_VA3: 20/
OD_VA3: 20/
OD_SPHERE: +1.50
OD_VA1: 20/30+2
OS_VA2: 20/20
OD_ADD: +2.50
OD_CYLINDER: -1.00
OS_VA1: 20/30+2

## 2019-10-02 ASSESSMENT — REFRACTION_AUTOREFRACTION
OS_CYLINDER: -1.00
OD_CYLINDER: -1.25
OD_AXIS: 098
OS_AXIS: 078
OS_SPHERE: +1.00
OD_SPHERE: +1.50

## 2019-10-02 ASSESSMENT — CONFRONTATIONAL VISUAL FIELD TEST (CVF)
OS_FINDINGS: FULL
OD_FINDINGS: FULL

## 2019-10-02 ASSESSMENT — DECREASING TEAR LAKE - SEVERITY SCORE
OS_DEC_TEARLAKE: 1+
OD_DEC_TEARLAKE: 1+

## 2019-10-02 ASSESSMENT — VISUAL ACUITY
OD_BCVA: 20/20-2
OS_BCVA: 20/30-1

## 2020-01-15 ENCOUNTER — DOCTOR'S OFFICE (OUTPATIENT)
Dept: URBAN - METROPOLITAN AREA CLINIC 136 | Facility: CLINIC | Age: 50
Setting detail: OPHTHALMOLOGY
End: 2020-01-15
Payer: COMMERCIAL

## 2020-01-15 ENCOUNTER — RX ONLY (RX ONLY)
Age: 50
End: 2020-01-15

## 2020-01-15 DIAGNOSIS — H35.379: ICD-10-CM

## 2020-01-15 DIAGNOSIS — H43.12: ICD-10-CM

## 2020-01-15 DIAGNOSIS — H43.812: ICD-10-CM

## 2020-01-15 DIAGNOSIS — E10.3553: ICD-10-CM

## 2020-01-15 PROCEDURE — 92134 CPTRZ OPH DX IMG PST SGM RTA: CPT | Performed by: OPHTHALMOLOGY

## 2020-01-15 PROCEDURE — 92012 INTRM OPH EXAM EST PATIENT: CPT | Performed by: OPHTHALMOLOGY

## 2020-01-15 ASSESSMENT — SPHEQUIV_DERIVED
OS_SPHEQUIV: 0.5
OD_SPHEQUIV: 0.875
OD_SPHEQUIV: 1
OS_SPHEQUIV: 0.5

## 2020-01-15 ASSESSMENT — REFRACTION_MANIFEST
OS_CYLINDER: -1.00
OD_VA1: 20/30+2
OD_VA2: 20/20
OD_ADD: +2.50
OS_VA2: 20/20
OU_VA: 20/30+2
OS_ADD: +2.50
OD_SPHERE: +1.50
OS_VA1: 20/30+2
OD_VA3: 20/
OS_SPHERE: +1.00
OD_AXIS: 100
OS_VA3: 20/
OD_CYLINDER: -1.00
OS_AXIS: 080

## 2020-01-15 ASSESSMENT — AXIALLENGTH_DERIVED
OD_AL: 23.3454
OD_AL: 23.2978
OS_AL: 23.2194
OS_AL: 23.2194

## 2020-01-15 ASSESSMENT — KERATOMETRY
OD_K1POWER_DIOPTERS: 43.50
OD_AXISANGLE_DEGREES: 017
OD_K2POWER_DIOPTERS: 43.00
OS_K2POWER_DIOPTERS: 43.75
OS_K1POWER_DIOPTERS: 44.25
OS_AXISANGLE_DEGREES: 012

## 2020-01-15 ASSESSMENT — DECREASING TEAR LAKE - SEVERITY SCORE
OD_DEC_TEARLAKE: 1+
OS_DEC_TEARLAKE: 1+

## 2020-01-15 ASSESSMENT — VISUAL ACUITY
OD_BCVA: 20/25
OS_BCVA: 20/40

## 2020-01-15 ASSESSMENT — SUPERFICIAL PUNCTATE KERATITIS (SPK)
OD_SPK: T
OS_SPK: T

## 2020-01-15 ASSESSMENT — REFRACTION_AUTOREFRACTION
OD_SPHERE: +1.50
OS_CYLINDER: -1.00
OD_CYLINDER: -1.25
OD_AXIS: 098
OS_SPHERE: +1.00
OS_AXIS: 078

## 2020-01-15 ASSESSMENT — CONFRONTATIONAL VISUAL FIELD TEST (CVF)
OD_FINDINGS: FULL
OS_FINDINGS: FULL

## 2020-07-21 ENCOUNTER — DOCTOR'S OFFICE (OUTPATIENT)
Dept: URBAN - METROPOLITAN AREA CLINIC 136 | Facility: CLINIC | Age: 50
Setting detail: OPHTHALMOLOGY
End: 2020-07-21
Payer: COMMERCIAL

## 2020-07-21 DIAGNOSIS — H43.812: ICD-10-CM

## 2020-07-21 DIAGNOSIS — H35.379: ICD-10-CM

## 2020-07-21 DIAGNOSIS — E10.3553: ICD-10-CM

## 2020-07-21 DIAGNOSIS — H43.12: ICD-10-CM

## 2020-07-21 PROCEDURE — 92014 COMPRE OPH EXAM EST PT 1/>: CPT | Performed by: OPHTHALMOLOGY

## 2020-07-21 PROCEDURE — 92134 CPTRZ OPH DX IMG PST SGM RTA: CPT | Performed by: OPHTHALMOLOGY

## 2020-07-21 ASSESSMENT — AXIALLENGTH_DERIVED
OS_AL: 23.2194
OD_AL: 23.3454
OD_AL: 23.2978
OS_AL: 23.2194

## 2020-07-21 ASSESSMENT — REFRACTION_MANIFEST
OD_VA2: 20/20
OS_ADD: +2.50
OS_SPHERE: +1.00
OS_CYLINDER: -1.00
OU_VA: 20/30+2
OD_AXIS: 100
OS_VA1: 20/30+2
OD_SPHERE: +1.50
OD_VA1: 20/30+2
OD_ADD: +2.50
OS_AXIS: 080
OD_CYLINDER: -1.00
OS_VA2: 20/20

## 2020-07-21 ASSESSMENT — SPHEQUIV_DERIVED
OD_SPHEQUIV: 1
OD_SPHEQUIV: 0.875
OS_SPHEQUIV: 0.5
OS_SPHEQUIV: 0.5

## 2020-07-21 ASSESSMENT — REFRACTION_AUTOREFRACTION
OD_AXIS: 098
OD_CYLINDER: -1.25
OS_AXIS: 078
OS_SPHERE: +1.00
OD_SPHERE: +1.50
OS_CYLINDER: -1.00

## 2020-07-21 ASSESSMENT — CONFRONTATIONAL VISUAL FIELD TEST (CVF)
OD_FINDINGS: FULL
OS_FINDINGS: FULL

## 2020-07-21 ASSESSMENT — KERATOMETRY
OS_K2POWER_DIOPTERS: 43.75
OS_AXISANGLE_DEGREES: 012
OD_K1POWER_DIOPTERS: 43.50
OS_K1POWER_DIOPTERS: 44.25
OD_K2POWER_DIOPTERS: 43.00
OD_AXISANGLE_DEGREES: 017

## 2020-07-21 ASSESSMENT — VISUAL ACUITY
OS_BCVA: 20/25
OD_BCVA: 20/20-2

## 2020-07-21 ASSESSMENT — SUPERFICIAL PUNCTATE KERATITIS (SPK)
OS_SPK: T
OD_SPK: T

## 2021-01-26 ENCOUNTER — DOCTOR'S OFFICE (OUTPATIENT)
Dept: URBAN - METROPOLITAN AREA CLINIC 136 | Facility: CLINIC | Age: 51
Setting detail: OPHTHALMOLOGY
End: 2021-01-26
Payer: COMMERCIAL

## 2021-01-26 VITALS — HEIGHT: 55 IN

## 2021-01-26 DIAGNOSIS — H43.812: ICD-10-CM

## 2021-01-26 DIAGNOSIS — E10.3553: ICD-10-CM

## 2021-01-26 DIAGNOSIS — Z96.1: ICD-10-CM

## 2021-01-26 DIAGNOSIS — H35.379: ICD-10-CM

## 2021-01-26 PROBLEM — H40.033 ANATOMICAL NARROW ANGLE; BOTH EYES: Status: ACTIVE | Noted: 2017-08-23

## 2021-01-26 PROBLEM — H52.4 HYPEROPIA, ASTIGMATISM, PRESBYOPIA OU: Status: ACTIVE | Noted: 2018-11-27

## 2021-01-26 PROBLEM — H43.12 VITREOUS HEMORRHAGE; LEFT EYE: Status: ACTIVE | Noted: 2019-07-24

## 2021-01-26 PROBLEM — H52.03 HYPEROPIA, ASTIGMATISM, PRESBYOPIA OU: Status: ACTIVE | Noted: 2018-11-27

## 2021-01-26 PROBLEM — H52.223 HYPEROPIA, ASTIGMATISM, PRESBYOPIA OU: Status: ACTIVE | Noted: 2018-11-27

## 2021-01-26 PROCEDURE — 92014 COMPRE OPH EXAM EST PT 1/>: CPT | Performed by: OPHTHALMOLOGY

## 2021-01-26 PROCEDURE — 92134 CPTRZ OPH DX IMG PST SGM RTA: CPT | Performed by: OPHTHALMOLOGY

## 2021-01-26 ASSESSMENT — CONFRONTATIONAL VISUAL FIELD TEST (CVF)
OD_FINDINGS: FULL
OS_FINDINGS: FULL

## 2021-01-26 ASSESSMENT — KERATOMETRY
OS_AXISANGLE_DEGREES: 012
OD_AXISANGLE_DEGREES: 017
OD_K2POWER_DIOPTERS: 43.00
OS_K1POWER_DIOPTERS: 44.25
OS_K2POWER_DIOPTERS: 43.75
OD_K1POWER_DIOPTERS: 43.50

## 2021-01-26 ASSESSMENT — SUPERFICIAL PUNCTATE KERATITIS (SPK)
OS_SPK: T
OD_SPK: T

## 2021-01-26 ASSESSMENT — SPHEQUIV_DERIVED
OD_SPHEQUIV: 1
OS_SPHEQUIV: 0.375
OS_SPHEQUIV: 0.5
OD_SPHEQUIV: 0.75

## 2021-01-26 ASSESSMENT — AXIALLENGTH_DERIVED
OD_AL: 23.2978
OS_AL: 23.2667
OD_AL: 23.3932
OS_AL: 23.2194

## 2021-01-26 ASSESSMENT — REFRACTION_MANIFEST
OD_CYLINDER: -1.00
OD_VA1: 20/30+2
OD_ADD: +2.50
OS_VA2: 20/20
OD_VA2: 20/20
OU_VA: 20/30+2
OD_SPHERE: +1.50
OS_AXIS: 080
OS_VA1: 20/30+2
OS_ADD: +2.50
OD_AXIS: 100
OS_CYLINDER: -1.00
OS_SPHERE: +1.00

## 2021-01-26 ASSESSMENT — REFRACTION_AUTOREFRACTION
OS_SPHERE: +1.00
OD_AXIS: 109
OD_SPHERE: +1.50
OD_CYLINDER: -1.50
OS_CYLINDER: -1.25
OS_AXIS: 81

## 2021-01-26 ASSESSMENT — TONOMETRY
OD_IOP_MMHG: 14
OS_IOP_MMHG: 16

## 2021-01-26 ASSESSMENT — VISUAL ACUITY
OD_BCVA: 20/30-2
OS_BCVA: 20/40-1

## 2021-03-15 RX ORDER — ESTRADIOL 0.1 MG/G
CREAM VAGINAL
Qty: 42.5 G | OUTPATIENT
Start: 2021-03-15

## 2021-03-15 NOTE — TELEPHONE ENCOUNTER
Refill request denied- overdue for annual gyn and was being seen by me at former practice with [facility]  Needs YV

## 2021-03-24 RX ORDER — ESTRADIOL 0.1 MG/G
CREAM VAGINAL
Qty: 42.5 G | OUTPATIENT
Start: 2021-03-24

## 2023-03-15 NOTE — ED PROVIDER NOTES
History  Chief Complaint   Patient presents with    Earache     Pt reports bilateral earache that started today  right ear is worse than left  Pt reports cough that started 2 days ago  Reports fever at home  45 yo female with 1 day of nasal congestion, subjective fever, dry cough, sore throat and b/l ear pain  History provided by:  Patient   used: No    Earache   Location:  Bilateral  Behind ear:  No abnormality  Quality:  Aching and dull  Severity:  Moderate  Onset quality:  Gradual  Duration:  1 day  Timing:  Constant  Progression:  Worsening  Chronicity:  New  Context: recent URI    Relieved by:  Nothing  Worsened by:  Nothing  Ineffective treatments:  None tried  Associated symptoms: congestion, cough (mild dry), fever (subjective) and sore throat    Associated symptoms: no abdominal pain, no diarrhea, no ear discharge, no headaches, no hearing loss, no neck pain, no rash, no rhinorrhea, no tinnitus and no vomiting        Prior to Admission Medications   Prescriptions Last Dose Informant Patient Reported? Taking? albuterol (PROVENTIL HFA,VENTOLIN HFA) 90 mcg/act inhaler   No No   Sig: Inhale 2 puffs every 4 (four) hours as needed for wheezing (or cough)   azithromycin (ZITHROMAX) 250 mg tablet   No No   Sig: Take first 2 tablets together, then 1 every day until finished  predniSONE 20 mg tablet   No No   Sig: Take 2 tablets by mouth daily      Facility-Administered Medications: None       Past Medical History:   Diagnosis Date    Asthma     Bronchitis     [facility]     Dialysis patient ([facility] )     Hyperlipidemia        Past Surgical History:   Procedure Laterality Date     SECTION      2    CHOLECYSTECTOMY      HYSTERECTOMY         History reviewed  No pertinent family history  I have reviewed and agree with the history as documented      Social History   Substance Use Topics    Smoking status: Never Smoker    Smokeless tobacco: Not on file    Alcohol use No        Review of Systems   Constitutional: Positive for appetite change, chills, fatigue and fever (subjective)  HENT: Positive for congestion, ear pain and sore throat  Negative for ear discharge, hearing loss, rhinorrhea, tinnitus, trouble swallowing and voice change  Eyes: Negative for visual disturbance  Respiratory: Positive for cough (mild dry)  Negative for chest tightness, shortness of breath and wheezing  Cardiovascular: Negative for chest pain, palpitations and leg swelling  Gastrointestinal: Negative for abdominal pain, diarrhea, nausea and vomiting  Genitourinary: Negative for dysuria, frequency, vaginal bleeding and vaginal discharge  Musculoskeletal: Negative for back pain, neck pain and neck stiffness  Skin: Negative for pallor and rash  Allergic/Immunologic: Negative for immunocompromised state  Neurological: Negative for light-headedness and headaches  Psychiatric/Behavioral: Negative for confusion  All other systems reviewed and are negative  Physical Exam  Physical Exam   Constitutional: She is oriented to person, place, and time  She appears well-developed and well-nourished  No distress  HENT:   Head: Normocephalic and atraumatic  Right Ear: External ear normal    Left Ear: External ear normal    Mild erythema post pharynx, no exudate or tonsillar swelling noted, b/l TM bulging, injected   Eyes: EOM are normal  Pupils are equal, round, and reactive to light  Neck: Normal range of motion  Neck supple  Cardiovascular: Normal rate and regular rhythm  No murmur heard  Pulmonary/Chest: Effort normal and breath sounds normal  No respiratory distress  Abdominal: Soft  Bowel sounds are normal    Musculoskeletal: Normal range of motion  Lymphadenopathy:     She has cervical adenopathy ( mild b /l anterior)  Neurological: She is alert and oriented to person, place, and time  Skin: Skin is warm  Capillary refill takes less than 2 seconds   No rash noted  No pallor  Psychiatric: She has a normal mood and affect  Her behavior is normal    Nursing note and vitals reviewed  Vital Signs  ED Triage Vitals [06/11/18 2152]   Temperature Pulse Respirations Blood Pressure SpO2   97 9 °F (36 6 °C) (!) 107 22 122/61 100 %      Temp Source Heart Rate Source Patient Position - Orthostatic VS BP Location FiO2 (%)   Temporal Monitor -- Right arm --      Pain Score       --           Vitals:    06/11/18 2152   BP: 122/61   Pulse: (!) 107       Visual Acuity      ED Medications  Medications - No data to display    Diagnostic Studies  Results Reviewed     None                 No orders to display              Procedures  Procedures       Phone Contacts  ED Phone Contact    ED Course  ED Course as of Jun 12 0038 Mon Jun 11, 2018   2239 Pt seen and examined  47 yo female with 1 day of nasal congestion, subjective fever, dry cough, sore throat and b/l ear pain  Plan to treat with zpack and motrin prn  MDM  CritCare Time    Disposition  Final diagnoses:   Acute viral pharyngitis   Viral URI with cough   Bilateral otitis media     Time reflects when diagnosis was documented in both MDM as applicable and the Disposition within this note     Time User Action Codes Description Comment    6/11/2018 10:42 PM Kaitlyn Epp Add [J02 8,  B97 89] Acute viral pharyngitis     6/11/2018 10:42 PM Mildred BROWNING Add [J06 9,  B97 89] Viral URI with cough     6/11/2018 10:42 PM Kaitlyn Epp Add [I31 34] Bilateral otitis media       ED Disposition     ED Disposition Condition Comment    Discharge  Kit Carson Carmella discharge to home/self care      Condition at discharge: Good        Follow-up Information    None         Discharge Medication List as of 6/11/2018 10:43 PM      START taking these medications    Details   !! azithromycin (ZITHROMAX Z-BRENDA) 250 mg tablet Take 2 tablets day 1, then 1 tablet daily for next 4 days, Print      ibuprofen (MOTRIN) 600 mg tablet Take 1 tablet (600 mg total) by mouth every 8 (eight) hours as needed for mild pain or fever, Starting Mon 6/11/2018, Print       !! - Potential duplicate medications found  Please discuss with provider  CONTINUE these medications which have NOT CHANGED    Details   albuterol (PROVENTIL HFA,VENTOLIN HFA) 90 mcg/act inhaler Inhale 2 puffs every 4 (four) hours as needed for wheezing (or cough), Starting Sun 8/6/2017, Print      !! azithromycin (ZITHROMAX) 250 mg tablet Take first 2 tablets together, then 1 every day until finished , Print      predniSONE 20 mg tablet Take 2 tablets by mouth daily, Starting Sun 8/6/2017, Print       !! - Potential duplicate medications found  Please discuss with provider  No discharge procedures on file      ED Provider  Electronically Signed by           Rahel Tomlinson DO  06/12/18 0038 80

## 2024-04-20 ENCOUNTER — APPOINTMENT (EMERGENCY)
Dept: CT IMAGING | Facility: HOSPITAL | Age: 54
End: 2024-04-20
Payer: COMMERCIAL

## 2024-04-20 ENCOUNTER — HOSPITAL ENCOUNTER (EMERGENCY)
Facility: HOSPITAL | Age: 54
Discharge: HOME | End: 2024-04-21
Attending: EMERGENCY MEDICINE
Payer: COMMERCIAL

## 2024-04-20 DIAGNOSIS — R10.9 ABDOMINAL PAIN, UNSPECIFIED ABDOMINAL LOCATION: Primary | ICD-10-CM

## 2024-04-20 DIAGNOSIS — J98.11 ATELECTASIS: ICD-10-CM

## 2024-04-20 DIAGNOSIS — R11.2 NAUSEA AND VOMITING, UNSPECIFIED VOMITING TYPE: ICD-10-CM

## 2024-04-20 LAB
ALBUMIN SERPL BCP-MCNC: 4.2 G/DL (ref 3.5–5)
ALP SERPL-CCNC: 90 U/L (ref 34–104)
ALT SERPL W P-5'-P-CCNC: 24 U/L (ref 7–52)
ANION GAP SERPL CALCULATED.3IONS-SCNC: 11 MMOL/L (ref 4–13)
AST SERPL W P-5'-P-CCNC: 19 U/L (ref 13–39)
BASOPHILS # BLD AUTO: 0.03 THOUSANDS/ÂΜL (ref 0–0.1)
BASOPHILS NFR BLD AUTO: 1 % (ref 0–1)
BILIRUB SERPL-MCNC: 0.48 MG/DL (ref 0.2–1)
BUN SERPL-MCNC: 23 MG/DL (ref 5–25)
CALCIUM SERPL-MCNC: 11.1 MG/DL (ref 8.4–10.2)
CHLORIDE SERPL-SCNC: 94 MMOL/L (ref 96–108)
CO2 SERPL-SCNC: 33 MMOL/L (ref 21–32)
CREAT SERPL-MCNC: 1.17 MG/DL (ref 0.6–1.3)
EOSINOPHIL # BLD AUTO: 0.3 THOUSAND/ÂΜL (ref 0–0.61)
EOSINOPHIL NFR BLD AUTO: 5 % (ref 0–6)
ERYTHROCYTE [DISTWIDTH] IN BLOOD BY AUTOMATED COUNT: 13.5 % (ref 11.6–15.1)
GFR SERPL CREATININE-BSD FRML MDRD: 52 ML/MIN/1.73SQ M
GLUCOSE SERPL-MCNC: 96 MG/DL (ref 65–140)
HCT VFR BLD AUTO: 40.6 % (ref 34.8–46.1)
HGB BLD-MCNC: 12.7 G/DL (ref 11.5–15.4)
IMM GRANULOCYTES # BLD AUTO: 0.03 THOUSAND/UL (ref 0–0.2)
IMM GRANULOCYTES NFR BLD AUTO: 1 % (ref 0–2)
LIPASE SERPL-CCNC: 38 U/L (ref 11–82)
LYMPHOCYTES # BLD AUTO: 1.06 THOUSANDS/ÂΜL (ref 0.6–4.47)
LYMPHOCYTES NFR BLD AUTO: 16 % (ref 14–44)
MAGNESIUM SERPL-MCNC: 1.7 MG/DL (ref 1.9–2.7)
MCH RBC QN AUTO: 28.5 PG (ref 26.8–34.3)
MCHC RBC AUTO-ENTMCNC: 31.3 G/DL (ref 31.4–37.4)
MCV RBC AUTO: 91 FL (ref 82–98)
MONOCYTES # BLD AUTO: 0.54 THOUSAND/ÂΜL (ref 0.17–1.22)
MONOCYTES NFR BLD AUTO: 8 % (ref 4–12)
NEUTROPHILS # BLD AUTO: 4.55 THOUSANDS/ÂΜL (ref 1.85–7.62)
NEUTS SEG NFR BLD AUTO: 69 % (ref 43–75)
NRBC BLD AUTO-RTO: 0 /100 WBCS
PHOSPHATE SERPL-MCNC: 2.5 MG/DL (ref 2.7–4.5)
PLATELET # BLD AUTO: 191 THOUSANDS/UL (ref 149–390)
PMV BLD AUTO: 12.3 FL (ref 8.9–12.7)
POTASSIUM SERPL-SCNC: 4.1 MMOL/L (ref 3.5–5.3)
PROT SERPL-MCNC: 7.1 G/DL (ref 6.4–8.4)
RBC # BLD AUTO: 4.46 MILLION/UL (ref 3.81–5.12)
SODIUM SERPL-SCNC: 138 MMOL/L (ref 135–147)
WBC # BLD AUTO: 6.51 THOUSAND/UL (ref 4.31–10.16)

## 2024-04-20 PROCEDURE — 85025 COMPLETE CBC W/AUTO DIFF WBC: CPT | Performed by: PHYSICIAN ASSISTANT

## 2024-04-20 PROCEDURE — 99285 EMERGENCY DEPT VISIT HI MDM: CPT | Performed by: PHYSICIAN ASSISTANT

## 2024-04-20 PROCEDURE — 83690 ASSAY OF LIPASE: CPT | Performed by: PHYSICIAN ASSISTANT

## 2024-04-20 PROCEDURE — 80053 COMPREHEN METABOLIC PANEL: CPT | Performed by: PHYSICIAN ASSISTANT

## 2024-04-20 PROCEDURE — 99284 EMERGENCY DEPT VISIT MOD MDM: CPT

## 2024-04-20 PROCEDURE — 93005 ELECTROCARDIOGRAM TRACING: CPT

## 2024-04-20 PROCEDURE — 36415 COLL VENOUS BLD VENIPUNCTURE: CPT | Performed by: PHYSICIAN ASSISTANT

## 2024-04-20 PROCEDURE — 96374 THER/PROPH/DIAG INJ IV PUSH: CPT

## 2024-04-20 PROCEDURE — 74176 CT ABD & PELVIS W/O CONTRAST: CPT

## 2024-04-20 PROCEDURE — 83735 ASSAY OF MAGNESIUM: CPT | Performed by: PHYSICIAN ASSISTANT

## 2024-04-20 PROCEDURE — 84100 ASSAY OF PHOSPHORUS: CPT | Performed by: PHYSICIAN ASSISTANT

## 2024-04-20 RX ORDER — ONDANSETRON 2 MG/ML
4 INJECTION INTRAMUSCULAR; INTRAVENOUS ONCE
Status: COMPLETED | OUTPATIENT
Start: 2024-04-20 | End: 2024-04-20

## 2024-04-20 RX ADMIN — ONDANSETRON 4 MG: 2 INJECTION INTRAMUSCULAR; INTRAVENOUS at 20:47

## 2024-04-21 VITALS
DIASTOLIC BLOOD PRESSURE: 66 MMHG | RESPIRATION RATE: 16 BRPM | OXYGEN SATURATION: 100 % | TEMPERATURE: 98.8 F | HEART RATE: 84 BPM | SYSTOLIC BLOOD PRESSURE: 162 MMHG | WEIGHT: 215.61 LBS

## 2024-04-21 LAB
ATRIAL RATE: 85 BPM
P AXIS: 29 DEGREES
PR INTERVAL: 168 MS
QRS AXIS: -6 DEGREES
QRSD INTERVAL: 78 MS
QT INTERVAL: 356 MS
QTC INTERVAL: 423 MS
T WAVE AXIS: 26 DEGREES
VENTRICULAR RATE: 85 BPM

## 2024-04-21 PROCEDURE — 93010 ELECTROCARDIOGRAM REPORT: CPT | Performed by: INTERNAL MEDICINE

## 2024-04-21 RX ORDER — ONDANSETRON 4 MG/1
4 TABLET, ORALLY DISINTEGRATING ORAL EVERY 6 HOURS PRN
Qty: 9 TABLET | Refills: 0 | Status: SHIPPED | OUTPATIENT
Start: 2024-04-21

## 2024-04-21 NOTE — DISCHARGE INSTRUCTIONS
Continue all regular medications.  Follow-up with your surgeon.  Return for new or worsening complaints.

## 2024-11-30 ENCOUNTER — APPOINTMENT (EMERGENCY)
Dept: RADIOLOGY | Facility: HOSPITAL | Age: 54
End: 2024-11-30
Payer: COMMERCIAL

## 2024-11-30 ENCOUNTER — HOSPITAL ENCOUNTER (EMERGENCY)
Facility: HOSPITAL | Age: 54
Discharge: OTHER INSTITUTION | End: 2024-11-30
Attending: EMERGENCY MEDICINE
Payer: COMMERCIAL

## 2024-11-30 ENCOUNTER — APPOINTMENT (EMERGENCY)
Dept: CT IMAGING | Facility: HOSPITAL | Age: 54
End: 2024-11-30
Payer: COMMERCIAL

## 2024-11-30 VITALS
DIASTOLIC BLOOD PRESSURE: 65 MMHG | HEART RATE: 86 BPM | RESPIRATION RATE: 22 BRPM | SYSTOLIC BLOOD PRESSURE: 135 MMHG | TEMPERATURE: 97.7 F | OXYGEN SATURATION: 97 %

## 2024-11-30 DIAGNOSIS — G93.40 ACUTE ENCEPHALOPATHY: ICD-10-CM

## 2024-11-30 DIAGNOSIS — R79.89 ELEVATED TROPONIN: ICD-10-CM

## 2024-11-30 DIAGNOSIS — R41.82 ALTERED MENTAL STATUS: Primary | ICD-10-CM

## 2024-11-30 DIAGNOSIS — N17.9 AKI (ACUTE KIDNEY INJURY) (HCC): ICD-10-CM

## 2024-11-30 DIAGNOSIS — J18.9 PNEUMONIA OF LEFT LOWER LOBE DUE TO INFECTIOUS ORGANISM: ICD-10-CM

## 2024-11-30 LAB
2HR DELTA HS TROPONIN: 152 NG/L
4HR DELTA HS TROPONIN: 383 NG/L
ALBUMIN SERPL BCG-MCNC: 3.7 G/DL (ref 3.5–5)
ALP SERPL-CCNC: 122 U/L (ref 34–104)
ALT SERPL W P-5'-P-CCNC: 18 U/L (ref 7–52)
ANION GAP SERPL CALCULATED.3IONS-SCNC: 6 MMOL/L (ref 4–13)
APTT PPP: 24 SECONDS (ref 23–34)
AST SERPL W P-5'-P-CCNC: 18 U/L (ref 13–39)
BASOPHILS # BLD AUTO: 0.03 THOUSANDS/ÂΜL (ref 0–0.1)
BASOPHILS NFR BLD AUTO: 0 % (ref 0–1)
BILIRUB SERPL-MCNC: 0.41 MG/DL (ref 0.2–1)
BNP SERPL-MCNC: 222 PG/ML (ref 0–100)
BUN SERPL-MCNC: 35 MG/DL (ref 5–25)
CALCIUM SERPL-MCNC: 9.2 MG/DL (ref 8.4–10.2)
CARDIAC TROPONIN I PNL SERPL HS: 303 NG/L (ref ?–50)
CARDIAC TROPONIN I PNL SERPL HS: 455 NG/L (ref ?–50)
CARDIAC TROPONIN I PNL SERPL HS: 686 NG/L (ref ?–50)
CHLORIDE SERPL-SCNC: 104 MMOL/L (ref 96–108)
CK SERPL-CCNC: 84 U/L (ref 26–192)
CO2 SERPL-SCNC: 26 MMOL/L (ref 21–32)
CREAT SERPL-MCNC: 1.7 MG/DL (ref 0.6–1.3)
EOSINOPHIL # BLD AUTO: 0.01 THOUSAND/ÂΜL (ref 0–0.61)
EOSINOPHIL NFR BLD AUTO: 0 % (ref 0–6)
ERYTHROCYTE [DISTWIDTH] IN BLOOD BY AUTOMATED COUNT: 15.6 % (ref 11.6–15.1)
FLUAV AG UPPER RESP QL IA.RAPID: NEGATIVE
FLUBV AG UPPER RESP QL IA.RAPID: NEGATIVE
GFR SERPL CREATININE-BSD FRML MDRD: 33 ML/MIN/1.73SQ M
GLUCOSE SERPL-MCNC: 102 MG/DL (ref 65–140)
GLUCOSE SERPL-MCNC: 52 MG/DL (ref 65–140)
HCT VFR BLD AUTO: 36.2 % (ref 34.8–46.1)
HGB BLD-MCNC: 11 G/DL (ref 11.5–15.4)
IMM GRANULOCYTES # BLD AUTO: 0.03 THOUSAND/UL (ref 0–0.2)
IMM GRANULOCYTES NFR BLD AUTO: 0 % (ref 0–2)
INR PPP: 1.07 (ref 0.85–1.19)
LACTATE SERPL-SCNC: 1.1 MMOL/L (ref 0.5–2)
LYMPHOCYTES # BLD AUTO: 0.62 THOUSANDS/ÂΜL (ref 0.6–4.47)
LYMPHOCYTES NFR BLD AUTO: 7 % (ref 14–44)
MCH RBC QN AUTO: 28.1 PG (ref 26.8–34.3)
MCHC RBC AUTO-ENTMCNC: 30.4 G/DL (ref 31.4–37.4)
MCV RBC AUTO: 92 FL (ref 82–98)
MONOCYTES # BLD AUTO: 0.76 THOUSAND/ÂΜL (ref 0.17–1.22)
MONOCYTES NFR BLD AUTO: 9 % (ref 4–12)
NEUTROPHILS # BLD AUTO: 6.98 THOUSANDS/ÂΜL (ref 1.85–7.62)
NEUTS SEG NFR BLD AUTO: 84 % (ref 43–75)
NRBC BLD AUTO-RTO: 0 /100 WBCS
PLATELET # BLD AUTO: 157 THOUSANDS/UL (ref 149–390)
PMV BLD AUTO: 11.7 FL (ref 8.9–12.7)
POTASSIUM SERPL-SCNC: 5.5 MMOL/L (ref 3.5–5.3)
PROCALCITONIN SERPL-MCNC: 7.99 NG/ML
PROT SERPL-MCNC: 6.5 G/DL (ref 6.4–8.4)
PROTHROMBIN TIME: 14.1 SECONDS (ref 12.3–15)
RBC # BLD AUTO: 3.92 MILLION/UL (ref 3.81–5.12)
SARS-COV+SARS-COV-2 AG RESP QL IA.RAPID: NEGATIVE
SODIUM SERPL-SCNC: 136 MMOL/L (ref 135–147)
WBC # BLD AUTO: 8.43 THOUSAND/UL (ref 4.31–10.16)

## 2024-11-30 PROCEDURE — 82550 ASSAY OF CK (CPK): CPT | Performed by: EMERGENCY MEDICINE

## 2024-11-30 PROCEDURE — 99285 EMERGENCY DEPT VISIT HI MDM: CPT

## 2024-11-30 PROCEDURE — 36415 COLL VENOUS BLD VENIPUNCTURE: CPT | Performed by: EMERGENCY MEDICINE

## 2024-11-30 PROCEDURE — 96361 HYDRATE IV INFUSION ADD-ON: CPT

## 2024-11-30 PROCEDURE — 71045 X-RAY EXAM CHEST 1 VIEW: CPT

## 2024-11-30 PROCEDURE — 83880 ASSAY OF NATRIURETIC PEPTIDE: CPT | Performed by: EMERGENCY MEDICINE

## 2024-11-30 PROCEDURE — 80053 COMPREHEN METABOLIC PANEL: CPT | Performed by: EMERGENCY MEDICINE

## 2024-11-30 PROCEDURE — 71250 CT THORAX DX C-: CPT

## 2024-11-30 PROCEDURE — 99291 CRITICAL CARE FIRST HOUR: CPT | Performed by: EMERGENCY MEDICINE

## 2024-11-30 PROCEDURE — 96366 THER/PROPH/DIAG IV INF ADDON: CPT

## 2024-11-30 PROCEDURE — 85610 PROTHROMBIN TIME: CPT | Performed by: EMERGENCY MEDICINE

## 2024-11-30 PROCEDURE — 83605 ASSAY OF LACTIC ACID: CPT | Performed by: EMERGENCY MEDICINE

## 2024-11-30 PROCEDURE — 70450 CT HEAD/BRAIN W/O DYE: CPT

## 2024-11-30 PROCEDURE — 36556 INSERT NON-TUNNEL CV CATH: CPT | Performed by: EMERGENCY MEDICINE

## 2024-11-30 PROCEDURE — 96365 THER/PROPH/DIAG IV INF INIT: CPT

## 2024-11-30 PROCEDURE — 85730 THROMBOPLASTIN TIME PARTIAL: CPT | Performed by: EMERGENCY MEDICINE

## 2024-11-30 PROCEDURE — 84484 ASSAY OF TROPONIN QUANT: CPT | Performed by: EMERGENCY MEDICINE

## 2024-11-30 PROCEDURE — 96360 HYDRATION IV INFUSION INIT: CPT

## 2024-11-30 PROCEDURE — 87040 BLOOD CULTURE FOR BACTERIA: CPT | Performed by: EMERGENCY MEDICINE

## 2024-11-30 PROCEDURE — 93005 ELECTROCARDIOGRAM TRACING: CPT

## 2024-11-30 PROCEDURE — 85025 COMPLETE CBC W/AUTO DIFF WBC: CPT | Performed by: EMERGENCY MEDICINE

## 2024-11-30 PROCEDURE — 87811 SARS-COV-2 COVID19 W/OPTIC: CPT | Performed by: EMERGENCY MEDICINE

## 2024-11-30 PROCEDURE — 84145 PROCALCITONIN (PCT): CPT | Performed by: EMERGENCY MEDICINE

## 2024-11-30 PROCEDURE — 82948 REAGENT STRIP/BLOOD GLUCOSE: CPT

## 2024-11-30 PROCEDURE — 87804 INFLUENZA ASSAY W/OPTIC: CPT | Performed by: EMERGENCY MEDICINE

## 2024-11-30 RX ORDER — SODIUM CHLORIDE 9 MG/ML
125 INJECTION, SOLUTION INTRAVENOUS CONTINUOUS
Status: DISCONTINUED | OUTPATIENT
Start: 2024-11-30 | End: 2024-11-30 | Stop reason: HOSPADM

## 2024-11-30 RX ORDER — LEVOFLOXACIN 5 MG/ML
750 INJECTION, SOLUTION INTRAVENOUS ONCE
Status: COMPLETED | OUTPATIENT
Start: 2024-11-30 | End: 2024-11-30

## 2024-11-30 RX ADMIN — SODIUM CHLORIDE 250 ML/HR: 0.9 INJECTION, SOLUTION INTRAVENOUS at 12:19

## 2024-11-30 RX ADMIN — LEVOFLOXACIN 750 MG: 5 INJECTION, SOLUTION INTRAVENOUS at 15:38

## 2024-11-30 NOTE — EMTALA/ACUTE CARE TRANSFER
[facility] EMERGENCY DEPARTMENT  [address] [address]  Dept: [phone]      EMTALA TRANSFER CONSENT    NAME Luz Reyescancel                                         1970                              MRN [id number]    I have been informed of my rights regarding examination, treatment, and transfer   by Dr. Debora Rodriguez DO    Benefits: Specialized equipment and/or services available at the receiving facility (Include comment)________________________    Risks: Potential for delay in receiving treatment, Potential deterioration of medical condition, Increased discomfort during transfer, Possible worsening of condition or death during transfer      Consent for Transfer:  I acknowledge that my medical condition has been evaluated and explained to me by the emergency department physician or other qualified medical person and/or my attending physician, who has recommended that I be transferred to the service of  Accepting Physician: dr Harman at Accepting Facility Name, City & State : [facility]. The above potential benefits of such transfer, the potential risks associated with such transfer, and the probable risks of not being transferred have been explained to me, and I fully understand them.  The doctor has explained that, in my case, the benefits of transfer outweigh the risks.  I agree to be transferred.    I authorize the performance of emergency medical procedures and treatments upon me in both transit and upon arrival at the receiving facility.  Additionally, I authorize the release of any and all medical records to the receiving facility and request they be transported with me, if possible.  I understand that the safest mode of transportation during a medical emergency is an ambulance and that the Hospital advocates the use of this mode of transport. Risks of traveling to the receiving facility by car, including absence of medical control,  life sustaining equipment, such as oxygen, and medical personnel has been explained to me and I fully understand them.    (YASMINE CORRECT BOX BELOW)  [  ]  I consent to the stated transfer and to be transported by ambulance/helicopter.  [  ]  I consent to the stated transfer, but refuse transportation by ambulance and accept full responsibility for my transportation by car.  I understand the risks of non-ambulance transfers and I exonerate the Hospital and its staff from any deterioration in my condition that results from this refusal.    X___________________________________________    DATE  24  TIME________  Signature of patient or legally responsible individual signing on patient behalf           RELATIONSHIP TO PATIENT_________________________          Provider Certification    NAME Luz Reyescancel                                        [facility] 1970                              MRN [id number]    A medical screening exam was performed on the above named patient.  Based on the examination:    Condition Necessitating Transfer The primary encounter diagnosis was Altered mental status. Diagnoses of SPENCER (acute kidney injury) (HCC), Acute encephalopathy, Pneumonia of left lower lobe due to infectious organism, and Elevated troponin were also pertinent to this visit.    Patient Condition: The patient has been stabilized such that within reasonable medical probability, no material deterioration of the patient condition or the condition of the unborn child(buck) is likely to result from the transfer    Reason for Transfer: Patient/Family request    Transfer Requirements: Facility [facility]   Space available and qualified personnel available for treatment as acknowledged by    Agreed to accept transfer and to provide appropriate medical treatment as acknowledged by       dr Harman  Appropriate medical records of the examination and treatment of the patient are provided at the time of transfer    STAFF INITIAL WHEN COMPLETED _______  Transfer will be performed by qualified personnel from    and appropriate transfer equipment as required, including the use of necessary and appropriate life support measures.    Provider Certification: I have examined the patient and explained the following risks and benefits of being transferred/refusing transfer to the patient/family:  General risk, such as traffic hazards, adverse weather conditions, rough terrain or turbulence, possible failure of equipment (including vehicle or aircraft), or consequences of actions of persons outside the control of the transport personnel      Based on these reasonable risks and benefits to the patient and/or the unborn child(buck), and based upon the information available at the time of the patient’s examination, I certify that the medical benefits reasonably to be expected from the provision of appropriate medical treatments at another medical facility outweigh the increasing risks, if any, to the individual’s medical condition, and in the case of labor to the unborn child, from effecting the transfer.    X____________________________________________ DATE 11/30/24        TIME_______      ORIGINAL - SEND TO MEDICAL RECORDS   COPY - SEND WITH PATIENT DURING TRANSFER   Never

## 2024-11-30 NOTE — ED PROVIDER NOTES
ED Disposition       None          Assessment & Plan       Medical Decision Making  54-year-old female with history of obesity, diabetes, restrictive lung disease, kidney transplant in 2018 with reportedly normal function on recent nephrology visit presents to the ED with altered mental status and hypoglycemia.  According to family members, the patient had been feeling well the last few days with URI symptoms and fever up to 101.  She got up to go to the bathroom at around 3 in the morning tripped and was laying on the floor since.  Family members found her earlier this morning.  EMS reports blood sugar 52 for which they gave dextrose.  Currently patient is listless but is awake and able to provide some history.  She states she feels cold and nauseous but denies other symptoms presently.  She is generally weak on exam but no focal deficits.  Lungs are clear.  Will do broad-based cardiac/infectious workup.  Will monitor blood glucose.  Likely patient will require admission given presentation and physical exam currently.    Amount and/or Complexity of Data Reviewed  Labs: ordered. Decision-making details documented in ED Course.  Radiology: ordered and independent interpretation performed.  ECG/medicine tests: ordered and independent interpretation performed.     Details: Normal sinus rhythm rate 88.  No ectopy.  Nonspecific ST-T wave changes.  Normal conduction.  No ischemia.  No STEMI.    Risk  Prescription drug management.        ED Course as of 11/30/24 1632   Sat Nov 30, 2024   1143 POC Glucose(!): 52   1143 Blood Pressure: 150/69   1143 Temperature: 97.7 °F (36.5 °C)   1143 Pulse: 84   1143 Respirations: 20   1143 SpO2: 91 %   1145 Chest x-ray: No infiltrate.    1219 CT head:1. Focal hypodensity in the right corona radiata is indeterminate but new in the interim. This may reflect age-indeterminate lacunar type ischemia, chronic microvascular ischemia, or demyelination. This could be better assessed with MRI if  clinically   warranted.  2. Otherwise unremarkable head CT.        1240 WBC: 8.43   1240 Hemoglobin(!): 11.0   1315 BNP(!): 222   1315 hs TnI 0hr(!): 303   1315 PTT: 24   1315 Sodium: 136   1315 Potassium(!): 5.5   1315 BUN(!): 35   1315 Creatinine(!): 1.70  Baseline 1.1   1315 GFR, Calculated: 33   1315 PROTIME: 14.1   1315 POCT INR: 1.07   1315 Procalcitonin(!): 7.99   1315 Total CK: 84   1315 LACTIC ACID: 1.1   1316 Chest x-ray formal reading:Increased opacity in the medial left base superimposed on scar, question atelectasis or pneumonia.     The study was marked in EPIC for immediate notification.        1343 hs TnI 0hr(!): 303   1344 Total CK: 84   1344 LACTIC ACID: 1.1   1449 SARS COV Rapid Antigen: Negative   1449 Influenza A Rapid Antigen: Negative   1449 Influenza B Rapid Antigen: Negative   1449 CT chest:Volume loss and near complete consolidation in the left lower lobe, corresponding with the abnormality on chest radiograph, with debris occluding the left lower lobe bronchus due to atelectasis and pneumonia, likely aspiration.        1513 [facility] medicine [facility] and this is where she receives her care.  Will go through patient access to contact [facility] admitting team   1602 hs TnI 2hr(!): 455   1602 Delta 2hr hsTnI(!): 152       Medications   sodium chloride 0.9 % infusion (has no administration in time range)       ED Risk Strat Scores                           SBIRT 22yo+      Flowsheet Row Most Recent Value   Initial Alcohol Screen: US AUDIT-C     1. How often do you have a drink containing alcohol? 0 Filed at: 11/30/2024 1036   2. How many drinks containing alcohol do you have on a typical day you are drinking?  0 Filed at: 11/30/2024 1036   3a. Male UNDER 65: How often do you have five or more drinks on one occasion? 0 Filed at: 11/30/2024 1036   3b. FEMALE Any Age, or MALE 65+: How often do you have 4  "or more drinks on one occassion? 0 Filed at: 2024 1036   Audit-C Score 0 Filed at: 2024 1036   DESTINY: How many times in the past year have you...    Used an illegal drug or used a prescription medication for non-medical reasons? Never Filed at: 2024 1036                            History of Present Illness       Chief Complaint   Patient presents with    Altered Mental Status     Patient arrives via EMS after being found down by caregiver this morning. Unknown downtime but patient did text caregiver at 0900 this am stating that she \"slid out of recliner\". Glucose for EMS was 49 - per EMS patient has not eaten since 1430 yesterday. Given D10 en route.        Past Medical History:   Diagnosis Date    Asthma     Bronchitis     Diabetes mellitus (HCC)     Dialysis patient (HCC)     Hyperlipidemia       Past Surgical History:   Procedure Laterality Date     SECTION      2    CHOLECYSTECTOMY      HYSTERECTOMY      US GUIDED LIVER BIOPSY  2024      History reviewed. No pertinent family history.   Social History     Tobacco Use    Smoking status: Never   Substance Use Topics    Alcohol use: No    Drug use: No      E-Cigarette/Vaping      E-Cigarette/Vaping Substances      I have reviewed and agree with the history as documented.     54-year-old female with history of diabetes, restrictive lung disease, hyperlipidemia, renal transplant in 2018 on tacrolimus presents to the emergency department with altered level of consciousness and hypoglycemia.  Patient is able to provide some history but most of the history is obtained via the patient's family members through .  Patient has been ill the last several days with URI symptoms and fever up to 101.  She apparently got up to go to the bathroom around 3 in the morning, tripped and fell and had been laying on the floor since.  Family at her and called EMS.  Patient is complaining of feeling cold and nauseous but otherwise does not " have any complaints.      History provided by:  Patient, medical records and relative   used: Yes    Altered Mental Status  Presenting symptoms: lethargy    Most recent episode:  Today  Duration:  3 hours  Timing:  Constant  Progression:  Unchanged  Chronicity:  New  Context: recent illness    Context: not alcohol use, not dementia, not head injury, taking medications as prescribed, not recent change in medication and not recent infection    Associated symptoms: decreased appetite, fever, nausea and weakness    Associated symptoms: no abdominal pain, no bladder incontinence, no difficulty breathing, no eye deviation, no headaches, no light-headedness, no palpitations, no rash, no seizures, no slurred speech, no visual change and no vomiting        Review of Systems   Constitutional:  Positive for activity change, appetite change, chills, decreased appetite and fever. Negative for diaphoresis and fatigue.   HENT: Negative.  Negative for congestion, rhinorrhea and sore throat.    Eyes: Negative.  Negative for discharge, redness and itching.   Respiratory: Negative.  Negative for apnea, cough, chest tightness, shortness of breath and wheezing.    Cardiovascular:  Negative for chest pain, palpitations and leg swelling.   Gastrointestinal:  Positive for nausea. Negative for abdominal pain, diarrhea and vomiting.   Endocrine: Negative.    Genitourinary: Negative.  Negative for bladder incontinence, flank pain, frequency and urgency.   Musculoskeletal: Negative.  Negative for back pain.   Skin: Negative.  Negative for rash.   Allergic/Immunologic: Negative.    Neurological:  Positive for weakness. Negative for dizziness, tremors, seizures, syncope, facial asymmetry, speech difficulty, light-headedness, numbness and headaches.   Hematological: Negative.    All other systems reviewed and are negative.          Objective       ED Triage Vitals   Temperature Pulse Blood Pressure Respirations SpO2 Patient  Position - Orthostatic VS   11/30/24 1116 11/30/24 1031 11/30/24 1031 11/30/24 1031 11/30/24 1031 11/30/24 1031   97.7 °F (36.5 °C) 84 150/69 20 91 % Sitting      Temp Source Heart Rate Source BP Location FiO2 (%) Pain Score    11/30/24 1116 11/30/24 1031 11/30/24 1031 -- --    Oral Monitor Left arm        Vitals      Date and Time Temp Pulse SpO2 Resp BP Pain Score FACES Pain Rating User   11/30/24 1116 97.7 °F (36.5 °C) -- -- -- -- -- -- EK   11/30/24 1031 -- 84 91 % 20 150/69 -- 0 EK            Physical Exam  Vitals and nursing note reviewed.   Constitutional:       General: She is not in acute distress.     Appearance: She is well-developed. She is obese. She is ill-appearing and diaphoretic. She is not toxic-appearing.      Comments: Listless   HENT:      Head: Normocephalic and atraumatic.      Right Ear: Tympanic membrane and external ear normal.      Left Ear: Tympanic membrane and external ear normal.      Nose: Nose normal.      Mouth/Throat:      Mouth: Mucous membranes are moist.      Pharynx: No oropharyngeal exudate or posterior oropharyngeal erythema.   Eyes:      General: No scleral icterus.        Right eye: No discharge.         Left eye: No discharge.      Extraocular Movements: Extraocular movements intact.      Right eye: No nystagmus.      Left eye: No nystagmus.      Conjunctiva/sclera: Conjunctivae normal.      Pupils: Pupils are equal, round, and reactive to light.   Neck:      Thyroid: No thyromegaly.      Vascular: No JVD.      Trachea: No tracheal deviation.   Cardiovascular:      Rate and Rhythm: Normal rate and regular rhythm.      Heart sounds: Normal heart sounds. No murmur heard.     No friction rub. No gallop.   Pulmonary:      Effort: Pulmonary effort is normal. No respiratory distress.      Breath sounds: Normal breath sounds. No stridor. No wheezing, rhonchi or rales.   Chest:      Chest wall: No tenderness.   Abdominal:      General: Bowel sounds are normal. There is no  distension.      Palpations: Abdomen is soft. There is no mass.      Tenderness: There is no abdominal tenderness.      Hernia: No hernia is present.   Musculoskeletal:         General: No swelling, tenderness, deformity or signs of injury. Normal range of motion.      Cervical back: Normal range of motion and neck supple.      Right lower leg: No edema.      Left lower leg: No edema.   Lymphadenopathy:      Cervical: No cervical adenopathy.   Skin:     General: Skin is warm.      Coloration: Skin is not jaundiced or pale.      Findings: No bruising, erythema, lesion or rash.   Neurological:      General: No focal deficit present.      Mental Status: She is oriented to person, place, and time.      Cranial Nerves: No cranial nerve deficit.      Motor: Weakness (Generalized/no focal deficit) present. No abnormal muscle tone.      Coordination: Coordination normal.      Gait: Gait normal.      Deep Tendon Reflexes: Reflexes are normal and symmetric. Reflexes normal.   Psychiatric:         Behavior: Behavior is cooperative.         Results Reviewed       Procedure Component Value Units Date/Time    B-Type Natriuretic Peptide(BNP) [[id number]]     Lab Status: No result Specimen: Blood     CBC and differential [[id number]]     Lab Status: No result Specimen: Blood     Protime-INR [[id number]]     Lab Status: No result Specimen: Blood     APTT [[id number]]     Lab Status: No result Specimen: Blood     Comprehensive metabolic panel [[id number]]     Lab Status: No result Specimen: Blood     Lactic acid, plasma (w/reflex if result > 2.0) [[id number]]     Lab Status: No result Specimen: Blood     CK [[id number]]     Lab Status: No result Specimen: Blood     HS Troponin 0hr (reflex protocol) [[id number]]     Lab Status: No result Specimen: Blood     FLU/COVID Rapid Antigen (30 min. TAT) - Preferred screening test in ED [[id number]]     Lab Status: No result Specimen: Nares from Nose     Procalcitonin [[id number]]     Lab Status: No  result Specimen: Blood     Fingerstick Glucose (POCT) [[id number]]  (Abnormal) Collected: 11/30/24 1032    Lab Status: Final result Specimen: Blood Updated: 11/30/24 1032     POC Glucose 52 mg/dl             XR chest 1 view portable   ED Interpretation by Debora Rodriguez DO (11/30 1144)   No infiltrate/vascular congestion      CT head without contrast    (Results Pending)       ECG 12 Lead Documentation Only    Date/Time: 11/30/2024 11:56 AM    Performed by: Debora Rodriguez DO  Authorized by: Debora Rodriguez DO    Indications / Diagnosis:  Altered  ECG reviewed by me, the ED Provider: yes    Patient location:  ED  Interpretation:     Interpretation: non-specific    Rate:     ECG rate:  88    ECG rate assessment: normal    Rhythm:     Rhythm: sinus rhythm    Ectopy:     Ectopy: none    Conduction:     Conduction: normal    ST segments:     ST segments:  Non-specific  T waves:     T waves: normal    CriticalCare Time    Date/Time: 11/30/2024 2:53 PM    Performed by: Debora Rodriguez DO  Authorized by: Debora Rodriguez DO    Critical care provider statement:     Critical care time (minutes):  30    Critical care time was exclusive of:  Separately billable procedures and treating other patients and teaching time    Critical care was necessary to treat or prevent imminent or life-threatening deterioration of the following conditions:  Respiratory failure    Critical care was time spent personally by me on the following activities:  Blood draw for specimens, obtaining history from patient or surrogate, development of treatment plan with patient or surrogate, discussions with consultants, evaluation of patient's response to treatment, examination of patient, interpretation of cardiac output measurements, ordering and performing treatments and interventions, ordering and review of laboratory studies, ordering and review of radiographic studies, re-evaluation of patient's condition and review of  old charts    I assumed direction of critical care for this patient from another provider in my specialty: no    Central Line    Date/Time: 11/30/2024 2:53 PM    Performed by: Debora Rodriguez DO  Authorized by: Debora Rodriguez DO    Patient location:  ED  Consent:     Consent obtained:  Emergent situation    Risks discussed:  Incorrect placement, infection and pneumothorax  Universal protocol:     Procedure explained and questions answered to patient or proxy's satisfaction: yes      Patient identity confirmed:  Arm band  Pre-procedure details:     Hand hygiene: Hand hygiene performed prior to insertion      Sterile barrier technique: All elements of maximal sterile technique followed      Skin preparation:  2% chlorhexidine  Indications:     Central line indications: no peripheral vascular access    Anesthesia (see MAR for exact dosages):     Anesthesia method:  Local infiltration    Local anesthetic:  Lidocaine 1% WITH epi  Procedure details:     Location:  Left subclavian    Vessel type: vein      Laterality:  Right    Approach: percutaneous technique used      Patient position:  Flat    Catheter type:  Triple lumen    Catheter size:  7 Fr    Ultrasound guidance: yes      Ultrasound image availability:  Not saved    Number of attempts:  1    Successful placement: yes      Catheter tip vessel location: subclavian vein    Post-procedure details:     Post-procedure:  Dressing applied    Assessment:  Blood return through all ports, free fluid flow, placement verified by x-ray and no pneumothorax on x-ray    Patient tolerance of procedure:  Tolerated well, no immediate complications  Comments:      Central line repositioned over wire to superior vena cava      ED Medication and Procedure Management   Prior to Admission Medications   Prescriptions Last Dose Informant Patient Reported? Taking?   albuterol (PROVENTIL HFA,VENTOLIN HFA) 90 mcg/act inhaler   No No   Sig: Inhale 2 puffs every 4 (four) hours as  needed for wheezing (or cough)   azithromycin (ZITHROMAX) 250 mg tablet   No No   Sig: Take first 2 tablets together, then 1 every day until finished.   ibuprofen (MOTRIN) 600 mg tablet   No No   Sig: Take 1 tablet (600 mg total) by mouth every 8 (eight) hours as needed for mild pain or fever   ondansetron (Zofran ODT) 4 mg disintegrating tablet   No No   Sig: Take 1 tablet (4 mg total) by mouth every 6 (six) hours as needed for nausea or vomiting   predniSONE 20 mg tablet   No No   Sig: Take 2 tablets by mouth daily      Facility-Administered Medications: None     Patient's Medications   Discharge Prescriptions    No medications on file     No discharge procedures on file.  ED SEPSIS DOCUMENTATION            Debora Rodriguez, DO  11/30/24 1156       Debora Rodriguez, DO  11/30/24 1453       Debora Rodriguez, DO  11/30/24 1455       Debora Rodriguez, DO  11/30/24 8902     calculation is accurate only with a steady state creatinine    CK [[id number]]  (Normal) Collected: 11/30/24 1215    Lab Status: Final result Specimen: Blood from Vein Updated: 11/30/24 1255     Total CK 84 U/L     HS Troponin 0hr (reflex protocol) [[id number]]  (Abnormal) Collected: 11/30/24 1215    Lab Status: Final result Specimen: Blood from Vein Updated: 11/30/24 1255     hs TnI 0hr 303 ng/L     Lactic acid, plasma (w/reflex if result > 2.0) [[id number]]  (Normal) Collected: 11/30/24 1215    Lab Status: Final result Specimen: Blood from Vein Updated: 11/30/24 1254     LACTIC ACID 1.1 mmol/L     Narrative:      Result may be elevated if tourniquet was used during collection.    CBC and differential [[id number]]  (Abnormal) Collected: 11/30/24 1215    Lab Status: Final result Specimen: Blood from Vein Updated: 11/30/24 1230     WBC 8.43 Thousand/uL      RBC 3.92 Million/uL      Hemoglobin 11.0 g/dL      Hematocrit 36.2 %      MCV 92 fL      MCH 28.1 pg      MCHC 30.4 g/dL      RDW 15.6 %      MPV 11.7 fL      Platelets 157 Thousands/uL      nRBC 0 /100 WBCs      Segmented % 84 %      Immature Grans % 0 %      Lymphocytes % 7 %      Monocytes % 9 %      Eosinophils Relative 0 %      Basophils Relative 0 %      Absolute Neutrophils 6.98 Thousands/µL      Absolute Immature Grans 0.03 Thousand/uL      Absolute Lymphocytes 0.62 Thousands/µL      Absolute Monocytes 0.76 Thousand/µL      Eosinophils Absolute 0.01 Thousand/µL      Basophils Absolute 0.03 Thousands/µL     Fingerstick Glucose (POCT) [[id number]]  (Abnormal) Collected: 11/30/24 1032    Lab Status: Final result Specimen: Blood Updated: 11/30/24 1032     POC Glucose 52 mg/dl             XR chest portable   Final Interpretation by Gregory Villalpando MD (11/30 2816)      Right IJ with tip overlying the lower SVC in appropriate position.         Workstation performed: [id number]         XR chest portable   Final Interpretation by Gregory Villalpando MD (11/30  1746)      Right IJ with tip overlying the lower SVC in appropriate position.         Workstation performed: [id number]         XR chest 1 view portable   Final Interpretation by Gino Rausch MD (11/30 1606)      Right neck catheter in the innominate SVC region.. This needs to be repositioned.      Findings discussed with the ordering department.         Workstation performed: [id number]         XR chest 1 view portable   Final Interpretation by Gregory Villalpando MD (11/30 1547)      1.  Malpositioned right IJ central venous catheter with tip overlying the left brachiocephalic vein; recommend repositioning.   2.  Stable medial left lung base opacity since same day radiograph.      I personally discussed this study with DEBORA RODRIGUEZ on 11/30/2024 3:39 PM.               Workstation performed: [id number]         CT chest without contrast   Final Interpretation by Karo Sheppard MD (11/30 1420)      Volume loss and near complete consolidation in the left lower lobe, corresponding with the abnormality on chest radiograph, with debris occluding the left lower lobe bronchus due to atelectasis and pneumonia, likely aspiration.         Workstation performed: [id number]         CT head without contrast   Final Interpretation by Barry Lucero MD (11/30 1200)      1. Focal hypodensity in the right corona radiata is indeterminate but new in the interim. This may reflect age-indeterminate lacunar type ischemia, chronic microvascular ischemia, or demyelination. This could be better assessed with MRI if clinically    warranted.   2. Otherwise unremarkable head CT.      The study was marked in EPIC for immediate notification.                  Workstation performed: [id number]         XR chest 1 view portable   ED Interpretation by Debora Rodriguez DO (11/30 1144)   No infiltrate/vascular congestion      Final Interpretation by Karo Sheppard MD (11/30 1301)      Increased opacity in the  medial left base superimposed on scar, question atelectasis or pneumonia.      The study was marked in EPIC for immediate notification.            Workstation performed: [id number]             ECG 12 Lead Documentation Only    Date/Time: 11/30/2024 11:56 AM    Performed by: Debora Rodriguez DO  Authorized by: Debora Rodriguez DO    Indications / Diagnosis:  Altered  ECG reviewed by me, the ED Provider: yes    Patient location:  ED  Interpretation:     Interpretation: non-specific    Rate:     ECG rate:  88    ECG rate assessment: normal    Rhythm:     Rhythm: sinus rhythm    Ectopy:     Ectopy: none    Conduction:     Conduction: normal    ST segments:     ST segments:  Non-specific  T waves:     T waves: normal    CriticalCare Time    Date/Time: 11/30/2024 2:53 PM    Performed by: Debora Rodriguez DO  Authorized by: Debora Rodriguez DO    Critical care provider statement:     Critical care time (minutes):  30    Critical care time was exclusive of:  Separately billable procedures and treating other patients and teaching time    Critical care was necessary to treat or prevent imminent or life-threatening deterioration of the following conditions:  Respiratory failure    Critical care was time spent personally by me on the following activities:  Blood draw for specimens, obtaining history from patient or surrogate, development of treatment plan with patient or surrogate, discussions with consultants, evaluation of patient's response to treatment, examination of patient, interpretation of cardiac output measurements, ordering and performing treatments and interventions, ordering and review of laboratory studies, ordering and review of radiographic studies, re-evaluation of patient's condition and review of old charts    I assumed direction of critical care for this patient from another provider in my specialty: no    Central Line    Date/Time: 11/30/2024 2:53 PM    Performed by: Debora Medley  DO Jennifer  Authorized by: Debora Rodriguez DO    Patient location:  ED  Consent:     Consent obtained:  Emergent situation    Risks discussed:  Incorrect placement, infection and pneumothorax  Universal protocol:     Procedure explained and questions answered to patient or proxy's satisfaction: yes      Patient identity confirmed:  Arm band  Pre-procedure details:     Hand hygiene: Hand hygiene performed prior to insertion      Sterile barrier technique: All elements of maximal sterile technique followed      Skin preparation:  2% chlorhexidine  Indications:     Central line indications: no peripheral vascular access    Anesthesia (see MAR for exact dosages):     Anesthesia method:  Local infiltration    Local anesthetic:  Lidocaine 1% WITH epi  Procedure details:     Location:  Left subclavian    Vessel type: vein      Laterality:  Right    Approach: percutaneous technique used      Patient position:  Flat    Catheter type:  Triple lumen    Catheter size:  7 Fr    Ultrasound guidance: yes      Ultrasound image availability:  Not saved    Number of attempts:  1    Successful placement: yes      Catheter tip vessel location: subclavian vein    Post-procedure details:     Post-procedure:  Dressing applied    Assessment:  Blood return through all ports, free fluid flow, placement verified by x-ray and no pneumothorax on x-ray    Patient tolerance of procedure:  Tolerated well, no immediate complications  Comments:      Central line repositioned over wire to superior vena cava      ED Medication and Procedure Management   Prior to Admission Medications   Prescriptions Last Dose Informant Patient Reported? Taking?   albuterol (PROVENTIL HFA,VENTOLIN HFA) 90 mcg/act inhaler   No No   Sig: Inhale 2 puffs every 4 (four) hours as needed for wheezing (or cough)   azithromycin (ZITHROMAX) 250 mg tablet   No No   Sig: Take first 2 tablets together, then 1 every day until finished.   ibuprofen (MOTRIN) 600 mg tablet   No  No   Sig: Take 1 tablet (600 mg total) by mouth every 8 (eight) hours as needed for mild pain or fever   ondansetron (Zofran ODT) 4 mg disintegrating tablet   No No   Sig: Take 1 tablet (4 mg total) by mouth every 6 (six) hours as needed for nausea or vomiting   predniSONE 20 mg tablet   No No   Sig: Take 2 tablets by mouth daily      Facility-Administered Medications: None     Discharge Medication List as of 11/30/2024  8:58 PM        CONTINUE these medications which have NOT CHANGED    Details   albuterol (PROVENTIL HFA,VENTOLIN HFA) 90 mcg/act inhaler Inhale 2 puffs every 4 (four) hours as needed for wheezing (or cough), Starting Sun 8/6/2017, Print      azithromycin (ZITHROMAX) 250 mg tablet Take first 2 tablets together, then 1 every day until finished., Print      ibuprofen (MOTRIN) 600 mg tablet Take 1 tablet (600 mg total) by mouth every 8 (eight) hours as needed for mild pain or fever, Starting Mon 6/11/2018, Print      ondansetron (Zofran ODT) 4 mg disintegrating tablet Take 1 tablet (4 mg total) by mouth every 6 (six) hours as needed for nausea or vomiting, Starting Sun 4/21/2024, Normal      predniSONE 20 mg tablet Take 2 tablets by mouth daily, Starting Sun 8/6/2017, Print           No discharge procedures on file.  ED SEPSIS DOCUMENTATION   Time reflects when diagnosis was documented in both MDM as applicable and the Disposition within this note       Time User Action Codes Description Comment    11/30/2024  5:40 PM Debora Rodriguez Add [R41.82] Altered mental status     11/30/2024  5:40 PM Debora Rodriguez Add [N17.9] SPENCER (acute kidney injury) (HCC)     11/30/2024  5:40 PM Debora Rodriguez Add [G93.40] Acute encephalopathy     11/30/2024  5:40 PM Debora Rodriguez Add [J18.9] Pneumonia of left lower lobe due to infectious organism     11/30/2024  5:41 PM Debora Rodriguez Add [R79.89] Elevated troponin                  Debora Rodriguez,   11/30/24 1156       Debora Rodriguez,  DO  11/30/24 1453       Debora Rodriguez, DO  11/30/24 1455       Debora Rodriguez, DO  11/30/24 1633       Debora Rodriguez, DO  12/19/24 0814

## 2024-11-30 NOTE — ED NOTES
Patient's spO2 82-90% on RA. Placed on NC O2 at this time. SpO2 now at 93% on 6L O2.      Bre Rondon RN  11/30/24 [id number]

## 2024-11-30 NOTE — ED NOTES
Attempted IV access, use of ultrasound machine without success. Patient tolerated well.      Yumiko Roger RN  11/30/24 [id number]

## 2024-11-30 NOTE — ED NOTES
Call placed to patient's sister to ask her to come to hospital to sign EMTALA papers for transfer to [facility]. Patient's sister said that she will come when her  gets home around 1800 or 1830.      Bre Rondon RN  11/30/24 1815       Bre Rondon RN  11/30/24 1817

## 2024-12-01 LAB
ATRIAL RATE: 85 BPM
ATRIAL RATE: 88 BPM
ATRIAL RATE: 88 BPM
P AXIS: 1 DEGREES
P AXIS: 5 DEGREES
PR INTERVAL: 150 MS
PR INTERVAL: 168 MS
QRS AXIS: 78 DEGREES
QRS AXIS: 80 DEGREES
QRS AXIS: 82 DEGREES
QRSD INTERVAL: 84 MS
QRSD INTERVAL: 84 MS
QRSD INTERVAL: 86 MS
QT INTERVAL: 358 MS
QT INTERVAL: 362 MS
QT INTERVAL: 370 MS
QTC INTERVAL: 430 MS
QTC INTERVAL: 433 MS
QTC INTERVAL: 447 MS
T WAVE AXIS: 11 DEGREES
T WAVE AXIS: 16 DEGREES
T WAVE AXIS: 6 DEGREES
VENTRICULAR RATE: 85 BPM
VENTRICULAR RATE: 88 BPM
VENTRICULAR RATE: 88 BPM

## 2024-12-01 PROCEDURE — 93010 ELECTROCARDIOGRAM REPORT: CPT

## 2024-12-05 LAB
BACTERIA BLD CULT: NORMAL
BACTERIA BLD CULT: NORMAL